# Patient Record
Sex: FEMALE | Race: WHITE | NOT HISPANIC OR LATINO | Employment: UNEMPLOYED | URBAN - METROPOLITAN AREA
[De-identification: names, ages, dates, MRNs, and addresses within clinical notes are randomized per-mention and may not be internally consistent; named-entity substitution may affect disease eponyms.]

---

## 2017-03-13 ENCOUNTER — HOSPITAL ENCOUNTER (OUTPATIENT)
Dept: CARDIOLOGY | Facility: HOSPITAL | Age: 57
Discharge: HOME | End: 2017-03-13
Payer: COMMERCIAL

## 2017-03-13 DIAGNOSIS — M16.12 PRIMARY OSTEOARTHRITIS OF LEFT HIP: ICD-10-CM

## 2017-03-29 ENCOUNTER — HOSPITAL ENCOUNTER (OUTPATIENT)
Dept: PREADMISSION TESTING | Facility: HOSPITAL | Age: 57
Discharge: HOME | End: 2017-03-29
Payer: COMMERCIAL

## 2017-03-29 VITALS
OXYGEN SATURATION: 100 % | WEIGHT: 163.81 LBS | SYSTOLIC BLOOD PRESSURE: 137 MMHG | DIASTOLIC BLOOD PRESSURE: 86 MMHG | BODY MASS INDEX: 30.14 KG/M2 | HEIGHT: 62 IN | RESPIRATION RATE: 17 BRPM | TEMPERATURE: 97 F | HEART RATE: 76 BPM

## 2017-03-29 RX ORDER — SODIUM CHLORIDE, SODIUM LACTATE, POTASSIUM CHLORIDE, CALCIUM CHLORIDE 600; 310; 30; 20 MG/100ML; MG/100ML; MG/100ML; MG/100ML
INJECTION, SOLUTION INTRAVENOUS CONTINUOUS
Status: CANCELLED | OUTPATIENT
Start: 2017-03-29

## 2017-03-29 RX ORDER — LIDOCAINE HYDROCHLORIDE 10 MG/ML
1 INJECTION, SOLUTION EPIDURAL; INFILTRATION; INTRACAUDAL; PERINEURAL ONCE
Status: CANCELLED | OUTPATIENT
Start: 2017-03-29 | End: 2017-03-29

## 2017-03-29 RX ORDER — SCOLOPAMINE TRANSDERMAL SYSTEM 1 MG/1
1 PATCH, EXTENDED RELEASE TRANSDERMAL
Status: CANCELLED | OUTPATIENT
Start: 2017-03-29 | End: 2017-03-29

## 2017-03-29 NOTE — DISCHARGE INSTRUCTIONS
Your surgery is scheduled for 04/04/17.    Please report to Outpatient Surgery Intake Office on the 2nd FLOOR at 0530 a.m.          INSTRUCTIONS IMPORTANT!!!  ¨ Do not eat or drink after 12 midnight-including water. OK to brush teeth, no   gum, candy or mints!    ¨ Take only these medicines with a small swallow of water-morning of surgery. Hyzaar          ____  Do not wear makeup, including mascara.  ____  No powder, lotions or creams to surgical area.  ____  Please remove all jewelry, including piercings and leave at home.  ____  No money or valuables needed. Please leave at home.  ____  Please bring any documents given by your doctor.  ____  If going home the same day, arrange for a ride home. You will not be able to             drive if Anesthesia was used.  ____  Wear loose fitting clothing. Allow for dressings, bandages.  ____  Stop Aspirin, Ibuprofen, Motrin and Aleve at least 3-5 days before surgery, unless otherwise instructed by your doctor, or the nurse.   You MAY use Tylenol/acetaminophen until day of surgery.  ____  Wash the surgical area with Hibiclens the night before surgery, and again the             morning of surgery.  Be sure to rinse hibiclens off completely (if instructed by   nurse).  ____  If you take diabetic medication, do not take am of surgery unless instructed by Doctor.  ____  Call MD for temperature above 101 degrees.  ____ Stop taking any Fish Oil supplement or any Vitamins that contain Vitamin E at least 5 days prior to surgery.  ____ Do Not wear your contact lenses the day of your procedure.  You may wear your glasses.        I have read or had read and explained to me, and understand the above information.  Additional comments or instructions:  For additional questions call [phone]     Take a Hibiclens shower twice a day for 3 days prior to surgery, including the morning of surgery.   Gargle with Listerine twice a day for 2 days prior to surgery, including the morning of  surgery.      Pre-Op Bathing Instructions    Before surgery, you can play an important role in your own health.    Because skin is not sterile, we need to be sure that your skin is as free of germs as possible. By following the instructions below, you can reduce the number of germs on your skin before surgery.    IMPORTANT: You will need to shower with a special soap called Hibiclens*, available at any pharmacy.  If you are allergic to Chlorhexidine (the antiseptic in Hibiclens), use an antibacterial soap such as Dial Soap for your preoperative shower.  You will shower with Hibiclens both the night before your surgery and the morning of your surgery.  Do not use Hibiclens on the head, face or genitals to avoid injury to those areas.    STEP #1: THE NIGHT BEFORE YOUR SURGERY     1. Do not shave the area of your body where your surgery will be performed.  2. Shower and wash your hair and body as usual with your normal soap and shampoo.  3. Rinse your hair and body thoroughly after you shower to remove all soap residue.  4. With your hand, apply one packet of Hibiclens soap to the surgical site.   5. Wash the site gently for five (5) minutes. Do not scrub your skin too hard.   6. Do not wash with your regular soap after Hibiclens is used.  7. Rinse your body thoroughly.  8. Pat yourself dry with a clean, soft towel.  9. Do not use lotion, cream, or powder.  10. Wear clean clothes.    STEP #2: THE MORNING OF YOUR SURGERY     1. Repeat Step #1.    * Not to be used by people allergic to Chlorhexidine.        Total Hip Replacement  Total hip replacement surgery almost always reduces joint pain. During this surgery, your problem hip joint is replaced with an artificial joint, called a prosthesis.  Benefits of hip replacement  Total hip replacement surgery almost always:  Stops or greatly reduces hip pain. Even the pain from surgery should go away within weeks.  Increases leg function. Without hip pain, youll be able to use  your legs more. This will build up your muscles.  Improves quality of life by allowing you to do daily tasks and low-impact activities in greater comfort.  Provides years of easier movement. Most total hip replacements last for many years.  Your surgical experience  You will most likely arrive at the hospital on the morning of surgery. In many cases, pre-op tests are done days or even weeks ahead of time. Follow all of your surgeons instructions on preparing for surgery. When you arrive, youll be given forms to fill out. You will also talk with the anesthesiologist,  the doctor who gives the anesthesia, if you havent done so already.  Preparing for surgery  You will be told when to stop eating and drinking before surgery. If you take daily medicines, especially blood thinners, ask your doctor if you should still take them the morning of surgery. You may need to stop certain medicines several days or weeks before the surgery. At the hospital your temperature, pulse, breathing, and blood pressure will be checked. An IV (intravenous) line will be started to provide fluids and medicines needed during surgery.    The surgical procedure  When the surgical team is ready, youll be taken to the operating room. There youll be given anesthesia. The anesthesia will help you sleep through surgery, or it will make you numb from the waist down. Then an incision is made, giving the surgeon access to your hip joint. The damaged ball is removed, and the socket is prepared to hold the prosthesis. After the new joint is in place, the incision is closed with staples or stitches.  Preparing the bone  The hip is a ball-and-socket joint. The ball is cut from the thighbone, and the surface of the old socket is smoothed. Then the new socket is put into the pelvis. The socket is usually press-fit and may be held in place with screws. A press-fit prosthesis has tiny pores on its surface that your bone will grow into. Cement or press-fit  may be used to hold the ball-and-stem portion of the hip replacement.    Joining the new parts  The new hip stem is inserted into the upper portion of your thighbone. After the stem is secure in the thighbone, the new ball and socket are joined. The stem of the prosthesis may be held with cement or press-fit. Your surgeon will choose the method that is best for you.  In the recovery room  After surgery youll be sent to the recovery room, also called the PACU (postanesthesia care unit). Your condition will be watched closely, and youll be given pain medications. You may have a catheter (small tube) in your bladder and a drain in your hip. To keep your new joint stable, a foam wedge or pillows may be placed between your legs. In some cases, a brace is used.  Risks and complications  As with any surgery, hip replacement has possible risks and complications. These include the following:  Reaction to the anesthesia  Blood clots  Infection  Dislocation of the joint or loosening of the prosthesis  Fracture  Wearing out the prosthetic  Damage to nearby blood vessels, bones, or nerves  Thigh pain   Date Last Reviewed: 8/28/2015  © 3449-0100 [employer]. [address] [place] [address]. All rights reserved. This information is not intended as a substitute for professional medical care. Always follow your healthcare professional's instructions.        Getting Ready for Hip Replacement Surgery  Take steps ahead of time to make recovery easier and safe. Make a few simple changes around the house. Also, arrange for someone to help you for a couple of weeks after your hip replacement. Before surgery, your surgeon may talk with you about ways to manage blood loss. You may also be told to see your primary care healthcare provider and your dentist. Treating health and dental problems now may improve healing after a joint replacement. If youre a smoker, do your best to stop or cut down.    Prepare at  home  Here are some suggestions:   · Stock up on canned and frozen foods. Store all supplies between waist and shoulder level.  · Prepare a room on the main living level if you usually sleep upstairs. Or set things up so you have to go upstairs only once a day.  ·  clutter and remove throw rugs. Tape down electrical cords.  Use special equipment  Special equipment may help you have a safer and easier recovery. If pre-op training is offered, try using the devices before surgery. Some of the most helpful equipment is listed below.  · An elevated toilet seat  · A shower seat  · Handrails and grab bars to help you steady yourself, especially when getting in and out of the tub  · A grasping device to pull on socks and shoes     Managing blood loss  During major surgery, a significant amount of blood loss can happen. This can cause the bodys red blood cell level to drop below a healthy range. Blood management can help prepare your body for surgery and recovery. There are three main ways to manage blood loss:   · You may be able to donate your own blood before surgery. Then you can receive your banked blood as needed.  · You may receive blood donated by another person. This blood is screened to rule out disease.  · A drug called epoetin mateo can be given before surgery. This drug stimulates the body to produce red blood cells, sometimes reducing the need for transfusion.  See your healthcare provider  Expect the following:  · You are likely to have lab or blood tests. You also may have an ECG (electrocardiogram). A chest X-ray may also be taken.  · Your healthcare provider will talk with you about health problems, such as diabetes and high blood pressure, which need to be controlled before surgery.  · Work with your healthcare provider to clear up any infections before surgery. Doing so helps ensure a better recovery.  · For your best recovery, youll be told to take care of yourself ahead of time. Maintain proper  nutrition and reduce alcohol intake.  · If you smoke, quit before surgery if possible. This may improve healing and reduce complications after joint replacement.  Discuss your medicines  Tell your surgeon about all of the medicines you take. This includes herbs and supplements, as well as prescription and over-the-counter medicines. Some medicines dont go well with anesthesia. Others, such as aspirin, blood thinners, and ibuprofen, increase bleeding. To avoid problems during surgery, you may need to stop taking certain medicines before hip replacement.  Finish dental work  Have any tooth or gum problems treated before surgery. Also, finish any dental work that may be under way. If you dont, germs in your mouth could enter the bloodstream and infect the new joint. This could delay your recovery. In an extreme case, infection in the new joint might mean that the prosthesis would have to be removed.  Date Last Reviewed: 10/1/2015  © 4891-1623 [employer]. [address] [place] [address]. All rights reserved. This information is not intended as a substitute for professional medical care. Always follow your healthcare professional's instructions.      Anesthesia: General Anesthesia  Youre due to have surgery. During surgery, youll be given medication called anesthesia. (It is also called anesthetic.) This will keep you comfortable and pain-free. Your anesthesia provider will use general anesthesia. This sheet tells you more about it.  What is general anesthesia?     You are watched continuously during your procedure by the anesthesia provider   General anesthesia puts you into a state like deep sleep. It goes into the bloodstream (IV anesthetics), into the lungs (gas anesthetics), or both. You feel nothing during the procedure. You will not remember it. During the procedure, the anesthesia provider monitors you continuously. He or she checks your heart rate and rhythm, blood pressure,  breathing, and blood oxygen.  · IV Anesthetics. IV anesthetics are given through an IV line in your arm. Theyre often given first. This is so you are asleep before a gas anesthetic is started. Some kinds of IV anesthetics relieve pain. Others relax you. Your doctor will decide which kind is best in your case.  · Gas Anesthetics. Gas anesthetics are breathed into the lungs. They are often used to keep you asleep. They can be given through a facemask or a tube placed in your larynx or trachea (breathing tube).  ¨ If you have a facemask, your anesthesia provider will most likely place it over your nose and mouth while youre still awake. Youll breathe oxygen through the mask as your IV anesthetic is started. Gas anesthetic may be added through the mask.  ¨ If you have a tube in the larynx or trachea, it will be inserted into your throat after youre asleep.  Anesthesia tools and medications  You will likely have:  · IV anesthetics. These are put into an IV line into your bloodstream.  · Gas anesthetics. You breathe these anesthetics into your lungs, where they pass into your bloodstream.  · Pulse oximeter. This is a small clip that is attached to the end of your finger. This measures your blood oxygen level.  · Electrocardiography leads (electrodes). These are small sticky pads that are placed on your chest. They record your heart rate and rhythm.  · Blood pressure cuff. This reads your blood pressure.  Risks and possible complications  General anesthesia has some risks. These include:  · Breathing problems  · Nausea and vomiting  · Sore throat or hoarseness (usually temporary)  · Allergic reaction to the anesthetic  · Irregular heartbeat (rare)  · Cardiac arrest (rare)   Anesthesia safety  · Follow all instructions you are given for how long not to eat or drink before your procedure.  · Be sure your doctor knows what medications and drugs you take. This includes over-the-counter medications, herbs, supplements,  alcohol or other drugs. You will be asked when those were last taken.  · Have an adult family member or friend drive you home after the procedure.  · For the first 24 hours after your surgery:  ¨ Do not drive or use heavy equipment.  ¨ Have a trusted family member or spouse make important decisions or sign documents.  ¨ Avoid alcohol.  ¨ Have a responsible adult stay with you. He or she can watch for problems and help keep you safe.  Date Last Reviewed: 10/16/2014  © 6765-7266 [employer]. [address] [place] [address], [place] [address]. All rights reserved. This information is not intended as a substitute for professional medical care. Always follow your healthcare professional's instructions.

## 2017-03-29 NOTE — IP AVS SNAPSHOT
[facility]  [address]  [place] [address]  Phone: [phone]           Patient Discharge Instructions  Our goal is to set you up for success. This packet includes information on your condition, medications, and your home care. It will help you care for yourself to prevent having to return to the hospital.     Please ask your nurse if you have any questions.      There are many details to remember when preparing for your surgery. Here is what you will need to do, please ask your nurse if there are more specific instructions and if you have any questions:    1. Before procedure Do not smoke or drink alcoholic beverages 24 hours prior to your procedure. Do not eat or drink anything 8 hours before your procedure - this includes gum, mints, and candy.     2. Day of procedure Please remove all jewelry for the procedure. If you wear contact lenses, dentures, hearing aids or glasses, bring a container to put them in during your surgery and give to a family member.  If your doctor has scheduled you for an overnight stay, bring a small overnight bag with any personal items that you need.      3. After procedure  Make arrangements in advance for transportation home by a responsible adult. It is not safe to drive a vehicle during the 24 hours following surgery.     PLEASE NOTE: You may be contacted the day before your surgery to confirm your surgery date and arrival time. The Surgery schedule has many variables which may affect the time of your surgery case. Family members should be available if your surgery time changes.               ** Verify the list of medication(s) below is accurate and up to date. Carry this with you in case of emergency. If your medications have changed, please notify your healthcare provider.             Medication List      TAKE these medications        Additional Info                      aspirin-acetaminophen-caffeine 250-250-65 mg 250-250-65 mg per tablet   Commonly known  as:  EXCEDRIN MIGRAINE   Refills:  0   Dose:  1 tablet    Instructions:  Take 1 tablet by mouth every 6 (six) hours as needed for Pain.     Begin Date    AM    Noon    PM    Bedtime       atorvastatin 40 MG tablet   Commonly known as:  LIPITOR   Refills:  0   Dose:  40 mg    Instructions:  Take 40 mg by mouth once daily.     Begin Date    AM    Noon    PM    Bedtime       B-complex with vitamin C tablet   Commonly known as:  Z-Bec or Equiv   Refills:  0   Dose:  1 tablet    Instructions:  Take 1 tablet by mouth once daily.     Begin Date    AM    Noon    PM    Bedtime       biotin 10,000 mcg Tbdl   Refills:  0    Instructions:  Take by mouth.     Begin Date    AM    Noon    PM    Bedtime       losartan-hydrochlorothiazide 100-25 mg 100-25 mg per tablet   Commonly known as:  HYZAAR   Refills:  0   Dose:  1 tablet    Instructions:  Take 1 tablet by mouth once daily.     Begin Date    AM    Noon    PM    Bedtime       potassium chloride SA 10 MEQ tablet   Commonly known as:  K-DUR,KLOR-CON   Refills:  0   Dose:  20 mEq    Instructions:  Take 20 mEq by mouth 2 (two) times daily.     Begin Date    AM    Noon    PM    Bedtime       VITAMIN D-3 ORAL   Refills:  0   Dose:  2000 mg    Instructions:  Take 2,000 mg by mouth once daily.     Begin Date    AM    Noon    PM    Bedtime                  Please bring to all follow up appointments:    1. A copy of your discharge instructions.  2. All medicines you are currently taking in their original bottles.  3. Identification and insurance card.    Please arrive 15 minutes ahead of scheduled appointment time.    Please call 24 hours in advance if you must reschedule your appointment and/or time.        Your Scheduled Appointments     Mar 29, 2017 11:30 AM CDT   Education Class with [facility] [facility]    [address] [place] [address]  [place] [address]   [phone]              Your Future Surgeries/Procedures     Apr 04, 2017   Surgery with [place] RAMON  MD Pedro   [facility]    [address] [facility] [place] [id number] [phone]                  Discharge Instructions       Your surgery is scheduled for 04/04/17.    Please report to Outpatient Surgery Intake Office on the 2nd FLOOR at 0530 a.m.          INSTRUCTIONS IMPORTANT!!!  ¨ Do not eat or drink after 12 midnight-including water. OK to brush teeth, no   gum, candy or mints!    ¨ Take only these medicines with a small swallow of water-morning of surgery. Hyzaar          ____  Do not wear makeup, including mascara.  ____  No powder, lotions or creams to surgical area.  ____  Please remove all jewelry, including piercings and leave at home.  ____  No money or valuables needed. Please leave at home.  ____  Please bring any documents given by your doctor.  ____  If going home the same day, arrange for a ride home. You will not be able to             drive if Anesthesia was used.  ____  Wear loose fitting clothing. Allow for dressings, bandages.  ____  Stop Aspirin, Ibuprofen, Motrin and Aleve at least 3-5 days before surgery, unless otherwise instructed by your doctor, or the nurse.   You MAY use Tylenol/acetaminophen until day of surgery.  ____  Wash the surgical area with Hibiclens the night before surgery, and again the             morning of surgery.  Be sure to rinse hibiclens off completely (if instructed by   nurse).  ____  If you take diabetic medication, do not take am of surgery unless instructed by Doctor.  ____  Call MD for temperature above 101 degrees.  ____ Stop taking any Fish Oil supplement or any Vitamins that contain Vitamin E at least 5 days prior to surgery.  ____ Do Not wear your contact lenses the day of your procedure.  You may wear your glasses.        I have read or had read and explained to me, and understand the above information.  Additional comments or instructions:  For additional questions call [phone]     Take a Hibiclens shower  twice a day for 3 days prior to surgery, including the morning of surgery.   Gargle with Listerine twice a day for 2 days prior to surgery, including the morning of surgery.      Pre-Op Bathing Instructions    Before surgery, you can play an important role in your own health.    Because skin is not sterile, we need to be sure that your skin is as free of germs as possible. By following the instructions below, you can reduce the number of germs on your skin before surgery.    IMPORTANT: You will need to shower with a special soap called Hibiclens*, available at any pharmacy.  If you are allergic to Chlorhexidine (the antiseptic in Hibiclens), use an antibacterial soap such as Dial Soap for your preoperative shower.  You will shower with Hibiclens both the night before your surgery and the morning of your surgery.  Do not use Hibiclens on the head, face or genitals to avoid injury to those areas.    STEP #1: THE NIGHT BEFORE YOUR SURGERY     1. Do not shave the area of your body where your surgery will be performed.  2. Shower and wash your hair and body as usual with your normal soap and shampoo.  3. Rinse your hair and body thoroughly after you shower to remove all soap residue.  4. With your hand, apply one packet of Hibiclens soap to the surgical site.   5. Wash the site gently for five (5) minutes. Do not scrub your skin too hard.   6. Do not wash with your regular soap after Hibiclens is used.  7. Rinse your body thoroughly.  8. Pat yourself dry with a clean, soft towel.  9. Do not use lotion, cream, or powder.  10. Wear clean clothes.    STEP #2: THE MORNING OF YOUR SURGERY     1. Repeat Step #1.    * Not to be used by people allergic to Chlorhexidine.        Total Hip Replacement  Total hip replacement surgery almost always reduces joint pain. During this surgery, your problem hip joint is replaced with an artificial joint, called a prosthesis.  Benefits of hip replacement  Total hip replacement surgery  almost always:  Stops or greatly reduces hip pain. Even the pain from surgery should go away within weeks.  Increases leg function. Without hip pain, youll be able to use your legs more. This will build up your muscles.  Improves quality of life by allowing you to do daily tasks and low-impact activities in greater comfort.  Provides years of easier movement. Most total hip replacements last for many years.  Your surgical experience  You will most likely arrive at the hospital on the morning of surgery. In many cases, pre-op tests are done days or even weeks ahead of time. Follow all of your surgeons instructions on preparing for surgery. When you arrive, youll be given forms to fill out. You will also talk with the anesthesiologist,  the doctor who gives the anesthesia, if you havent done so already.  Preparing for surgery  You will be told when to stop eating and drinking before surgery. If you take daily medicines, especially blood thinners, ask your doctor if you should still take them the morning of surgery. You may need to stop certain medicines several days or weeks before the surgery. At the hospital your temperature, pulse, breathing, and blood pressure will be checked. An IV (intravenous) line will be started to provide fluids and medicines needed during surgery.    The surgical procedure  When the surgical team is ready, youll be taken to the operating room. There youll be given anesthesia. The anesthesia will help you sleep through surgery, or it will make you numb from the waist down. Then an incision is made, giving the surgeon access to your hip joint. The damaged ball is removed, and the socket is prepared to hold the prosthesis. After the new joint is in place, the incision is closed with staples or stitches.  Preparing the bone  The hip is a ball-and-socket joint. The ball is cut from the thighbone, and the surface of the old socket is smoothed. Then the new socket is put into the pelvis. The  socket is usually press-fit and may be held in place with screws. A press-fit prosthesis has tiny pores on its surface that your bone will grow into. Cement or press-fit may be used to hold the ball-and-stem portion of the hip replacement.    Joining the new parts  The new hip stem is inserted into the upper portion of your thighbone. After the stem is secure in the thighbone, the new ball and socket are joined. The stem of the prosthesis may be held with cement or press-fit. Your surgeon will choose the method that is best for you.  In the recovery room  After surgery youll be sent to the recovery room, also called the PACU (postanesthesia care unit). Your condition will be watched closely, and youll be given pain medications. You may have a catheter (small tube) in your bladder and a drain in your hip. To keep your new joint stable, a foam wedge or pillows may be placed between your legs. In some cases, a brace is used.  Risks and complications  As with any surgery, hip replacement has possible risks and complications. These include the following:  Reaction to the anesthesia  Blood clots  Infection  Dislocation of the joint or loosening of the prosthesis  Fracture  Wearing out the prosthetic  Damage to nearby blood vessels, bones, or nerves  Thigh pain   Date Last Reviewed: 8/28/2015  © 6608-9511 [employer], [employer]. [address] [place] [address]. All rights reserved. This information is not intended as a substitute for professional medical care. Always follow your healthcare professional's instructions.        Getting Ready for Hip Replacement Surgery  Take steps ahead of time to make recovery easier and safe. Make a few simple changes around the house. Also, arrange for someone to help you for a couple of weeks after your hip replacement. Before surgery, your surgeon may talk with you about ways to manage blood loss. You may also be told to see your primary care healthcare provider and your  dentist. Treating health and dental problems now may improve healing after a joint replacement. If youre a smoker, do your best to stop or cut down.    Prepare at home  Here are some suggestions:   · Stock up on canned and frozen foods. Store all supplies between waist and shoulder level.  · Prepare a room on the main living level if you usually sleep upstairs. Or set things up so you have to go upstairs only once a day.  ·  clutter and remove throw rugs. Tape down electrical cords.  Use special equipment  Special equipment may help you have a safer and easier recovery. If pre-op training is offered, try using the devices before surgery. Some of the most helpful equipment is listed below.  · An elevated toilet seat  · A shower seat  · Handrails and grab bars to help you steady yourself, especially when getting in and out of the tub  · A grasping device to pull on socks and shoes     Managing blood loss  During major surgery, a significant amount of blood loss can happen. This can cause the bodys red blood cell level to drop below a healthy range. Blood management can help prepare your body for surgery and recovery. There are three main ways to manage blood loss:   · You may be able to donate your own blood before surgery. Then you can receive your banked blood as needed.  · You may receive blood donated by another person. This blood is screened to rule out disease.  · A drug called epoetin mateo can be given before surgery. This drug stimulates the body to produce red blood cells, sometimes reducing the need for transfusion.  See your healthcare provider  Expect the following:  · You are likely to have lab or blood tests. You also may have an ECG (electrocardiogram). A chest X-ray may also be taken.  · Your healthcare provider will talk with you about health problems, such as diabetes and high blood pressure, which need to be controlled before surgery.  · Work with your healthcare provider to clear up any  infections before surgery. Doing so helps ensure a better recovery.  · For your best recovery, youll be told to take care of yourself ahead of time. Maintain proper nutrition and reduce alcohol intake.  · If you smoke, quit before surgery if possible. This may improve healing and reduce complications after joint replacement.  Discuss your medicines  Tell your surgeon about all of the medicines you take. This includes herbs and supplements, as well as prescription and over-the-counter medicines. Some medicines dont go well with anesthesia. Others, such as aspirin, blood thinners, and ibuprofen, increase bleeding. To avoid problems during surgery, you may need to stop taking certain medicines before hip replacement.  Finish dental work  Have any tooth or gum problems treated before surgery. Also, finish any dental work that may be under way. If you dont, germs in your mouth could enter the bloodstream and infect the new joint. This could delay your recovery. In an extreme case, infection in the new joint might mean that the prosthesis would have to be removed.  Date Last Reviewed: 10/1/2015  © 9107-9660 [employer]. [address] [place] [address]. All rights reserved. This information is not intended as a substitute for professional medical care. Always follow your healthcare professional's instructions.      Anesthesia: General Anesthesia  Youre due to have surgery. During surgery, youll be given medication called anesthesia. (It is also called anesthetic.) This will keep you comfortable and pain-free. Your anesthesia provider will use general anesthesia. This sheet tells you more about it.  What is general anesthesia?     You are watched continuously during your procedure by the anesthesia provider   General anesthesia puts you into a state like deep sleep. It goes into the bloodstream (IV anesthetics), into the lungs (gas anesthetics), or both. You feel nothing during the procedure.  You will not remember it. During the procedure, the anesthesia provider monitors you continuously. He or she checks your heart rate and rhythm, blood pressure, breathing, and blood oxygen.  · IV Anesthetics. IV anesthetics are given through an IV line in your arm. Theyre often given first. This is so you are asleep before a gas anesthetic is started. Some kinds of IV anesthetics relieve pain. Others relax you. Your doctor will decide which kind is best in your case.  · Gas Anesthetics. Gas anesthetics are breathed into the lungs. They are often used to keep you asleep. They can be given through a facemask or a tube placed in your larynx or trachea (breathing tube).  ¨ If you have a facemask, your anesthesia provider will most likely place it over your nose and mouth while youre still awake. Youll breathe oxygen through the mask as your IV anesthetic is started. Gas anesthetic may be added through the mask.  ¨ If you have a tube in the larynx or trachea, it will be inserted into your throat after youre asleep.  Anesthesia tools and medications  You will likely have:  · IV anesthetics. These are put into an IV line into your bloodstream.  · Gas anesthetics. You breathe these anesthetics into your lungs, where they pass into your bloodstream.  · Pulse oximeter. This is a small clip that is attached to the end of your finger. This measures your blood oxygen level.  · Electrocardiography leads (electrodes). These are small sticky pads that are placed on your chest. They record your heart rate and rhythm.  · Blood pressure cuff. This reads your blood pressure.  Risks and possible complications  General anesthesia has some risks. These include:  · Breathing problems  · Nausea and vomiting  · Sore throat or hoarseness (usually temporary)  · Allergic reaction to the anesthetic  · Irregular heartbeat (rare)  · Cardiac arrest (rare)   Anesthesia safety  · Follow all instructions you are given for how long not to eat or  drink before your procedure.  · Be sure your doctor knows what medications and drugs you take. This includes over-the-counter medications, herbs, supplements, alcohol or other drugs. You will be asked when those were last taken.  · Have an adult family member or friend drive you home after the procedure.  · For the first 24 hours after your surgery:  ¨ Do not drive or use heavy equipment.  ¨ Have a trusted family member or spouse make important decisions or sign documents.  ¨ Avoid alcohol.  ¨ Have a responsible adult stay with you. He or she can watch for problems and help keep you safe.  Date Last Reviewed: 10/16/2014  © 8009-6005 [employer]. [address] [place] [address], [place] [address]. All rights reserved. This information is not intended as a substitute for professional medical care. Always follow your healthcare professional's instructions.                  Admission Information     Date & Time Provider Department CSN    3/29/2017 10:00 AM Len Vasquez MD [facility] [id number]      Care Providers     Provider Role Specialty Primary office phone    Len Vasquez MD Attending Provider Orthopedic Surgery [phone]      Recent Lab Values     No lab values to display.      Allergies as of 3/29/2017     No Known Allergies      [facility]     [facility] Nurse Care Line - 24/7 Assistance  Unless otherwise directed by your provider, please contact [facility], our nurse care line that is available for 24/7 assistance.     Registered nurses in the [facility] provide clinical advisement, health education, appointment booking, and other advisory services.  Call for this free service at [phone].        Advance Directives     An advance directive is a document which, in the event you are no longer able to make decisions for yourself, tells your healthcare team what kind of treatment you do or do not want to receive, or who you would like to make  those decisions for you.  If you do not currently have an advance directive, [facility] encourages you to create one.  For more information call:  [phone]), [phone] ([phone]),  or log on to [email or web address].        Language Assistance Services     ATTENTION: Language assistance services are available, free of charge. Please call [phone].      ATENCIÓN: Si habla letitia, tiene a north disposición servicios gratuitos de asistencia lingüística. Llame al [phone].     [facility] Ý: N?u b?n nói Ti?ng Vi?t, có các d?ch v? h? tr? ngôn ng? mi?n phí dành cho b?n. G?i s? [phone].        [facility] Sign-Up     Activating your [facility] account is as easy as 1-2-3!     1) Visit [email or web address], select Sign Up Now, enter this activation code and your date of birth, then select Next.  [email or web address]  Expires: 5/13/2017 10:30 AM      2) Create a username and password to use when you visit [facility] in the future and select a security question in case you lose your password and select Next.    3) Enter your e-mail address and click Sign Up!    Additional Information  If you have questions, please e-mail [email or web address] or call [phone] to talk to our [facility] staff. Remember, [facility] is NOT to be used for urgent needs. For medical emergencies, dial 911.          [facility] complies with applicable Federal civil rights laws and does not discriminate on the basis of race, color, national origin, age, disability, or sex.

## 2017-03-29 NOTE — PLAN OF CARE
Cardio clearance received with no note from doctor, patient to call office and try to get note faxed over

## 2017-04-11 ENCOUNTER — ANESTHESIA EVENT (OUTPATIENT)
Dept: SURGERY | Facility: HOSPITAL | Age: 57
DRG: 470 | End: 2017-04-11
Payer: COMMERCIAL

## 2017-04-11 ENCOUNTER — ANESTHESIA (OUTPATIENT)
Dept: SURGERY | Facility: HOSPITAL | Age: 57
DRG: 470 | End: 2017-04-11
Payer: COMMERCIAL

## 2017-04-11 ENCOUNTER — HOSPITAL ENCOUNTER (INPATIENT)
Facility: HOSPITAL | Age: 57
LOS: 1 days | Discharge: HOME HEALTH | DRG: 470 | End: 2017-04-12
Payer: COMMERCIAL

## 2017-04-11 DIAGNOSIS — M16.9 DEGENERATIVE JOINT DISEASE (DJD) OF HIP: ICD-10-CM

## 2017-04-11 PROCEDURE — 71000039 HC RECOVERY, EACH ADD'L HOUR

## 2017-04-11 PROCEDURE — 36000711

## 2017-04-11 PROCEDURE — 63600175 PHARM REV CODE 636 W HCPCS: Performed by: ANESTHESIOLOGY

## 2017-04-11 PROCEDURE — 37000008 HC ANESTHESIA 1ST 15 MINUTES

## 2017-04-11 PROCEDURE — C1776 JOINT DEVICE (IMPLANTABLE): HCPCS

## 2017-04-11 PROCEDURE — 97530 THERAPEUTIC ACTIVITIES: CPT

## 2017-04-11 PROCEDURE — 37000009 HC ANESTHESIA EA ADD 15 MINS

## 2017-04-11 PROCEDURE — 27800903 OPTIME MED/SURG SUP & DEVICES OTHER IMPLANTS

## 2017-04-11 PROCEDURE — 11000001 HC ACUTE MED/SURG PRIVATE ROOM

## 2017-04-11 PROCEDURE — 63600175 PHARM REV CODE 636 W HCPCS

## 2017-04-11 PROCEDURE — 25000003 PHARM REV CODE 250: Performed by: NURSE ANESTHETIST, CERTIFIED REGISTERED

## 2017-04-11 PROCEDURE — 25000003 PHARM REV CODE 250

## 2017-04-11 PROCEDURE — 63600175 PHARM REV CODE 636 W HCPCS: Performed by: NURSE ANESTHETIST, CERTIFIED REGISTERED

## 2017-04-11 PROCEDURE — 36000710

## 2017-04-11 PROCEDURE — 97165 OT EVAL LOW COMPLEX 30 MIN: CPT

## 2017-04-11 PROCEDURE — 0SRB04Z REPLACEMENT OF LEFT HIP JOINT WITH CERAMIC ON POLYETHYLENE SYNTHETIC SUBSTITUTE, OPEN APPROACH: ICD-10-PCS

## 2017-04-11 PROCEDURE — 97161 PT EVAL LOW COMPLEX 20 MIN: CPT

## 2017-04-11 PROCEDURE — 97116 GAIT TRAINING THERAPY: CPT

## 2017-04-11 PROCEDURE — 71000033 HC RECOVERY, INTIAL HOUR

## 2017-04-11 PROCEDURE — 25000003 PHARM REV CODE 250: Performed by: ANESTHESIOLOGY

## 2017-04-11 PROCEDURE — 94761 N-INVAS EAR/PLS OXIMETRY MLT: CPT

## 2017-04-11 PROCEDURE — C1713 ANCHOR/SCREW BN/BN,TIS/BN: HCPCS

## 2017-04-11 PROCEDURE — 27201423 OPTIME MED/SURG SUP & DEVICES STERILE SUPPLY

## 2017-04-11 DEVICE — HEAD CERAMIC DELTA 36MM +5: Type: IMPLANTABLE DEVICE | Site: HIP | Status: FUNCTIONAL

## 2017-04-11 DEVICE — CUP ACET PINN 10D 32MM 52MM: Type: IMPLANTABLE DEVICE | Site: HIP | Status: FUNCTIONAL

## 2017-04-11 DEVICE — STEM FEM TPR PCT STD SZ 3: Type: IMPLANTABLE DEVICE | Site: HIP | Status: FUNCTIONAL

## 2017-04-11 DEVICE — LINER ACET PINN SZ36 10D+4X52M: Type: IMPLANTABLE DEVICE | Site: HIP | Status: FUNCTIONAL

## 2017-04-11 DEVICE — SCREW PINNACLE 6.5 X 20: Type: IMPLANTABLE DEVICE | Site: HIP | Status: FUNCTIONAL

## 2017-04-11 DEVICE — PLUG ELIM HOLE POSITIVE STOP: Type: IMPLANTABLE DEVICE | Site: HIP | Status: FUNCTIONAL

## 2017-04-11 RX ORDER — HYDROMORPHONE HYDROCHLORIDE 1 MG/ML
0.2 INJECTION, SOLUTION INTRAMUSCULAR; INTRAVENOUS; SUBCUTANEOUS
Status: DISCONTINUED | OUTPATIENT
Start: 2017-04-11 | End: 2017-04-12 | Stop reason: HOSPADM

## 2017-04-11 RX ORDER — CEFAZOLIN SODIUM 2 G/50ML
2 SOLUTION INTRAVENOUS
Status: COMPLETED | OUTPATIENT
Start: 2017-04-11 | End: 2017-04-12

## 2017-04-11 RX ORDER — SCOLOPAMINE TRANSDERMAL SYSTEM 1 MG/1
1 PATCH, EXTENDED RELEASE TRANSDERMAL ONCE
Status: COMPLETED | OUTPATIENT
Start: 2017-04-11 | End: 2017-04-11

## 2017-04-11 RX ORDER — GLYCOPYRROLATE 0.2 MG/ML
INJECTION INTRAMUSCULAR; INTRAVENOUS
Status: DISCONTINUED | OUTPATIENT
Start: 2017-04-11 | End: 2017-04-11

## 2017-04-11 RX ORDER — CEFAZOLIN SODIUM 2 G/50ML
2 SOLUTION INTRAVENOUS ONCE
Status: COMPLETED | OUTPATIENT
Start: 2017-04-11 | End: 2017-04-11

## 2017-04-11 RX ORDER — SODIUM CHLORIDE 9 MG/ML
INJECTION, SOLUTION INTRAVENOUS CONTINUOUS PRN
Status: DISCONTINUED | OUTPATIENT
Start: 2017-04-11 | End: 2017-04-11

## 2017-04-11 RX ORDER — MUPIROCIN 20 MG/G
1 OINTMENT TOPICAL 2 TIMES DAILY
Status: DISCONTINUED | OUTPATIENT
Start: 2017-04-11 | End: 2017-04-12 | Stop reason: HOSPADM

## 2017-04-11 RX ORDER — TRANEXAMIC ACID 100 MG/ML
1000 INJECTION, SOLUTION INTRAVENOUS ONCE
Status: COMPLETED | OUTPATIENT
Start: 2017-04-11 | End: 2017-04-11

## 2017-04-11 RX ORDER — OXYCODONE HYDROCHLORIDE 5 MG/1
10 TABLET ORAL EVERY 4 HOURS PRN
Status: DISCONTINUED | OUTPATIENT
Start: 2017-04-11 | End: 2017-04-12 | Stop reason: HOSPADM

## 2017-04-11 RX ORDER — LOPERAMIDE HYDROCHLORIDE 2 MG/1
4 CAPSULE ORAL ONCE
Status: DISCONTINUED | OUTPATIENT
Start: 2017-04-11 | End: 2017-04-12 | Stop reason: HOSPADM

## 2017-04-11 RX ORDER — ZOLPIDEM TARTRATE 5 MG/1
5 TABLET ORAL NIGHTLY PRN
Status: DISCONTINUED | OUTPATIENT
Start: 2017-04-11 | End: 2017-04-12 | Stop reason: HOSPADM

## 2017-04-11 RX ORDER — LABETALOL HYDROCHLORIDE 5 MG/ML
INJECTION, SOLUTION INTRAVENOUS
Status: DISCONTINUED | OUTPATIENT
Start: 2017-04-11 | End: 2017-04-11

## 2017-04-11 RX ORDER — FENTANYL CITRATE 50 UG/ML
INJECTION, SOLUTION INTRAMUSCULAR; INTRAVENOUS
Status: DISCONTINUED | OUTPATIENT
Start: 2017-04-11 | End: 2017-04-11

## 2017-04-11 RX ORDER — HYDROCHLOROTHIAZIDE 25 MG/1
25 TABLET ORAL DAILY
Status: DISCONTINUED | OUTPATIENT
Start: 2017-04-11 | End: 2017-04-12 | Stop reason: HOSPADM

## 2017-04-11 RX ORDER — MIDAZOLAM HYDROCHLORIDE 1 MG/ML
INJECTION, SOLUTION INTRAMUSCULAR; INTRAVENOUS
Status: DISCONTINUED | OUTPATIENT
Start: 2017-04-11 | End: 2017-04-11

## 2017-04-11 RX ORDER — SUCCINYLCHOLINE CHLORIDE 20 MG/ML
INJECTION INTRAMUSCULAR; INTRAVENOUS
Status: DISCONTINUED | OUTPATIENT
Start: 2017-04-11 | End: 2017-04-11

## 2017-04-11 RX ORDER — SODIUM CHLORIDE 0.9 % (FLUSH) 0.9 %
3 SYRINGE (ML) INJECTION EVERY 8 HOURS
Status: DISCONTINUED | OUTPATIENT
Start: 2017-04-11 | End: 2017-04-11 | Stop reason: HOSPADM

## 2017-04-11 RX ORDER — ATORVASTATIN CALCIUM 10 MG/1
40 TABLET, FILM COATED ORAL DAILY
Status: DISCONTINUED | OUTPATIENT
Start: 2017-04-11 | End: 2017-04-12 | Stop reason: HOSPADM

## 2017-04-11 RX ORDER — DEXAMETHASONE SODIUM PHOSPHATE 4 MG/ML
INJECTION, SOLUTION INTRA-ARTICULAR; INTRALESIONAL; INTRAMUSCULAR; INTRAVENOUS; SOFT TISSUE
Status: DISCONTINUED | OUTPATIENT
Start: 2017-04-11 | End: 2017-04-11

## 2017-04-11 RX ORDER — SODIUM CHLORIDE, SODIUM LACTATE, POTASSIUM CHLORIDE, CALCIUM CHLORIDE 600; 310; 30; 20 MG/100ML; MG/100ML; MG/100ML; MG/100ML
INJECTION, SOLUTION INTRAVENOUS CONTINUOUS
Status: DISCONTINUED | OUTPATIENT
Start: 2017-04-11 | End: 2017-04-12

## 2017-04-11 RX ORDER — AMOXICILLIN 250 MG
1 CAPSULE ORAL 2 TIMES DAILY
Status: DISCONTINUED | OUTPATIENT
Start: 2017-04-11 | End: 2017-04-12 | Stop reason: HOSPADM

## 2017-04-11 RX ORDER — ACETAMINOPHEN 325 MG/1
650 TABLET ORAL EVERY 4 HOURS PRN
Status: DISCONTINUED | OUTPATIENT
Start: 2017-04-11 | End: 2017-04-12 | Stop reason: HOSPADM

## 2017-04-11 RX ORDER — SODIUM CHLORIDE 0.9 % (FLUSH) 0.9 %
3 SYRINGE (ML) INJECTION
Status: DISCONTINUED | OUTPATIENT
Start: 2017-04-11 | End: 2017-04-11

## 2017-04-11 RX ORDER — NEOSTIGMINE METHYLSULFATE 1 MG/ML
INJECTION, SOLUTION INTRAVENOUS
Status: DISCONTINUED | OUTPATIENT
Start: 2017-04-11 | End: 2017-04-11

## 2017-04-11 RX ORDER — LIDOCAINE HCL/PF 100 MG/5ML
SYRINGE (ML) INTRAVENOUS
Status: DISCONTINUED | OUTPATIENT
Start: 2017-04-11 | End: 2017-04-11

## 2017-04-11 RX ORDER — ONDANSETRON 2 MG/ML
4 INJECTION INTRAMUSCULAR; INTRAVENOUS EVERY 12 HOURS PRN
Status: DISCONTINUED | OUTPATIENT
Start: 2017-04-11 | End: 2017-04-12 | Stop reason: HOSPADM

## 2017-04-11 RX ORDER — CHOLECALCIFEROL (VITAMIN D3) 25 MCG
5000 TABLET ORAL DAILY
Status: DISCONTINUED | OUTPATIENT
Start: 2017-04-11 | End: 2017-04-12 | Stop reason: HOSPADM

## 2017-04-11 RX ORDER — PROPOFOL 10 MG/ML
VIAL (ML) INTRAVENOUS
Status: DISCONTINUED | OUTPATIENT
Start: 2017-04-11 | End: 2017-04-11

## 2017-04-11 RX ORDER — POTASSIUM CHLORIDE 20 MEQ/1
20 TABLET, EXTENDED RELEASE ORAL 2 TIMES DAILY
Status: DISCONTINUED | OUTPATIENT
Start: 2017-04-11 | End: 2017-04-12 | Stop reason: HOSPADM

## 2017-04-11 RX ORDER — HYDROMORPHONE HYDROCHLORIDE 2 MG/ML
0.5 INJECTION, SOLUTION INTRAMUSCULAR; INTRAVENOUS; SUBCUTANEOUS EVERY 5 MIN PRN
Status: DISCONTINUED | OUTPATIENT
Start: 2017-04-11 | End: 2017-04-11 | Stop reason: HOSPADM

## 2017-04-11 RX ORDER — ACETAMINOPHEN 10 MG/ML
INJECTION, SOLUTION INTRAVENOUS
Status: DISCONTINUED | OUTPATIENT
Start: 2017-04-11 | End: 2017-04-11

## 2017-04-11 RX ORDER — ONDANSETRON 2 MG/ML
INJECTION INTRAMUSCULAR; INTRAVENOUS
Status: DISCONTINUED | OUTPATIENT
Start: 2017-04-11 | End: 2017-04-11

## 2017-04-11 RX ORDER — POLYETHYLENE GLYCOL 3350 17 G/17G
17 POWDER, FOR SOLUTION ORAL DAILY
Status: DISCONTINUED | OUTPATIENT
Start: 2017-04-11 | End: 2017-04-12 | Stop reason: HOSPADM

## 2017-04-11 RX ORDER — ONDANSETRON 2 MG/ML
4 INJECTION INTRAMUSCULAR; INTRAVENOUS ONCE AS NEEDED
Status: DISCONTINUED | OUTPATIENT
Start: 2017-04-11 | End: 2017-04-11 | Stop reason: HOSPADM

## 2017-04-11 RX ORDER — LOSARTAN POTASSIUM 50 MG/1
100 TABLET ORAL DAILY
Status: DISCONTINUED | OUTPATIENT
Start: 2017-04-11 | End: 2017-04-12 | Stop reason: HOSPADM

## 2017-04-11 RX ORDER — ROCURONIUM BROMIDE 10 MG/ML
INJECTION, SOLUTION INTRAVENOUS
Status: DISCONTINUED | OUTPATIENT
Start: 2017-04-11 | End: 2017-04-11

## 2017-04-11 RX ORDER — SODIUM CHLORIDE 9 MG/ML
INJECTION, SOLUTION INTRAVENOUS CONTINUOUS
Status: DISCONTINUED | OUTPATIENT
Start: 2017-04-11 | End: 2017-04-11

## 2017-04-11 RX ADMIN — SUCCINYLCHOLINE CHLORIDE 120 MG: 20 INJECTION, SOLUTION INTRAMUSCULAR; INTRAVENOUS at 09:04

## 2017-04-11 RX ADMIN — MIDAZOLAM 2 MG: 1 INJECTION INTRAMUSCULAR; INTRAVENOUS at 09:04

## 2017-04-11 RX ADMIN — SCOPALAMINE 1.5 MG: 1 PATCH, EXTENDED RELEASE TRANSDERMAL at 07:04

## 2017-04-11 RX ADMIN — HYDROMORPHONE HYDROCHLORIDE 0.5 MG: 2 INJECTION, SOLUTION INTRAMUSCULAR; INTRAVENOUS; SUBCUTANEOUS at 12:04

## 2017-04-11 RX ADMIN — FENTANYL CITRATE 50 MCG: 50 INJECTION, SOLUTION INTRAMUSCULAR; INTRAVENOUS at 09:04

## 2017-04-11 RX ADMIN — ROCURONIUM BROMIDE 5 MG: 10 INJECTION, SOLUTION INTRAVENOUS at 09:04

## 2017-04-11 RX ADMIN — ONDANSETRON 4 MG: 2 INJECTION, SOLUTION INTRAMUSCULAR; INTRAVENOUS at 10:04

## 2017-04-11 RX ADMIN — PROPOFOL 160 MG: 10 INJECTION, EMULSION INTRAVENOUS at 09:04

## 2017-04-11 RX ADMIN — SODIUM CHLORIDE: 0.9 INJECTION, SOLUTION INTRAVENOUS at 09:04

## 2017-04-11 RX ADMIN — LIDOCAINE HYDROCHLORIDE 100 MG: 20 INJECTION, SOLUTION INTRAVENOUS at 09:04

## 2017-04-11 RX ADMIN — FENTANYL CITRATE 100 MCG: 50 INJECTION, SOLUTION INTRAMUSCULAR; INTRAVENOUS at 09:04

## 2017-04-11 RX ADMIN — LABETALOL HYDROCHLORIDE 5 MG: 5 INJECTION, SOLUTION INTRAVENOUS at 10:04

## 2017-04-11 RX ADMIN — CEFAZOLIN SODIUM 2 G: 2 SOLUTION INTRAVENOUS at 09:04

## 2017-04-11 RX ADMIN — HYDROMORPHONE HYDROCHLORIDE 0.2 MG: 1 INJECTION, SOLUTION INTRAMUSCULAR; INTRAVENOUS; SUBCUTANEOUS at 09:04

## 2017-04-11 RX ADMIN — TRANEXAMIC ACID 1000 MG: 100 INJECTION, SOLUTION INTRAVENOUS at 10:04

## 2017-04-11 RX ADMIN — DEXAMETHASONE SODIUM PHOSPHATE 8 MG: 4 INJECTION, SOLUTION INTRAMUSCULAR; INTRAVENOUS at 09:04

## 2017-04-11 RX ADMIN — ACETAMINOPHEN 1000 MG: 10 INJECTION, SOLUTION INTRAVENOUS at 09:04

## 2017-04-11 RX ADMIN — HYDROMORPHONE HYDROCHLORIDE 0.5 MG: 2 INJECTION, SOLUTION INTRAMUSCULAR; INTRAVENOUS; SUBCUTANEOUS at 11:04

## 2017-04-11 RX ADMIN — OXYCODONE HYDROCHLORIDE 10 MG: 5 TABLET ORAL at 07:04

## 2017-04-11 RX ADMIN — ROCURONIUM BROMIDE 10 MG: 10 INJECTION, SOLUTION INTRAVENOUS at 10:04

## 2017-04-11 RX ADMIN — TRANEXAMIC ACID 1000 MG: 100 INJECTION, SOLUTION INTRAVENOUS at 09:04

## 2017-04-11 RX ADMIN — OXYCODONE HYDROCHLORIDE 10 MG: 5 TABLET ORAL at 02:04

## 2017-04-11 RX ADMIN — GLYCOPYRROLATE 0.6 MG: 0.2 INJECTION, SOLUTION INTRAMUSCULAR; INTRAVENOUS at 10:04

## 2017-04-11 RX ADMIN — ONDANSETRON 4 MG: 2 INJECTION INTRAMUSCULAR; INTRAVENOUS at 05:04

## 2017-04-11 RX ADMIN — NEOSTIGMINE METHYLSULFATE 4 MG: 1 INJECTION INTRAVENOUS at 10:04

## 2017-04-11 RX ADMIN — POTASSIUM CHLORIDE 20 MEQ: 20 TABLET, EXTENDED RELEASE ORAL at 09:04

## 2017-04-11 RX ADMIN — RIVAROXABAN 10 MG: 10 TABLET, FILM COATED ORAL at 05:04

## 2017-04-11 RX ADMIN — SODIUM CHLORIDE, SODIUM LACTATE, POTASSIUM CHLORIDE, AND CALCIUM CHLORIDE: .6; .31; .03; .02 INJECTION, SOLUTION INTRAVENOUS at 04:04

## 2017-04-11 RX ADMIN — MUPIROCIN 1 G: 20 OINTMENT TOPICAL at 09:04

## 2017-04-11 RX ADMIN — ROCURONIUM BROMIDE 25 MG: 10 INJECTION, SOLUTION INTRAVENOUS at 09:04

## 2017-04-11 RX ADMIN — CEFAZOLIN SODIUM 2 G: 2 SOLUTION INTRAVENOUS at 05:04

## 2017-04-11 RX ADMIN — STANDARDIZED SENNA CONCENTRATE AND DOCUSATE SODIUM 1 TABLET: 8.6; 5 TABLET, FILM COATED ORAL at 09:04

## 2017-04-11 RX ADMIN — LIDOCAINE HYDROCHLORIDE 100 MG: 20 INJECTION, SOLUTION INTRAVENOUS at 11:04

## 2017-04-11 RX ADMIN — ONDANSETRON 4 MG: 2 INJECTION, SOLUTION INTRAMUSCULAR; INTRAVENOUS at 09:04

## 2017-04-11 NOTE — H&P
H&P completed on 03/13/2017 has been reviewed, the patient has been examined and:  I concur with the findings and no changes have occurred since H&P was written.    There are no hospital problems to display for this patient.

## 2017-04-11 NOTE — PLAN OF CARE
Problem: Occupational Therapy Goal  Goal: Occupational Therapy Goal  Goals to be met by: 5/11/17     Patient will increase functional independence with ADLs by performing:    LE Dressing with Minimal Assistance.  Grooming while standing with Supervision.  Toileting from toilet with Supervision for hygiene and clothing management.   Supine to sit with Supervision.  Stand pivot transfers with Supervision.  Toilet transfer to toilet with Supervision.  Outcome: Ongoing (interventions implemented as appropriate)  Pt would benefit from cont OT services in order to maximize functional independence. Recommending HHOT/PT at d/c

## 2017-04-11 NOTE — ANESTHESIA PREPROCEDURE EVALUATION
04/11/2017  Mini Sexton is a 56 y.o., female.    [facility] Anesthesia Evaluation     I have reviewed the Nursing Notes.   I have reviewed the Medications.     Review of Systems  Anesthesia Hx:  No problems with previous Anesthesia Denies Hx of Anesthetic complications Denies Family Hx of Anesthesia complications.    Social:  Non-Smoker, No Alcohol Use    Hematology/Oncology:  Hematology Normal   Oncology Normal     EENT/Dental:EENT/Dental Normal   Cardiovascular:   Exercise tolerance: good Hypertension, well controlled  Functional Capacity good / => 4 METS    Pulmonary:  Pulmonary Normal    Renal/:  Renal/ Normal     Hepatic/GI:  Hepatic/GI Normal    Musculoskeletal:   Arthritis     Neurological:  Neurology Normal    Endocrine:  Endocrine Normal        Physical Exam  General:  Well nourished    Airway/Jaw/Neck:  Airway Findings: Mouth Opening: Normal Tongue: Normal  General Airway Assessment: Adult  Mallampati: II  TM Distance: Normal, at least 6 cm  Jaw/Neck Findings:     Neck ROM: Normal ROM      Dental:  Dental Findings: In tact   Chest/Lungs:  Chest/Lungs Findings: Clear to auscultation     Heart/Vascular:  Heart Findings: Rate: Normal  Rhythm: Regular Rhythm        Mental Status:  Mental Status Findings:  Cooperative, Alert and Oriented         Anesthesia Plan  Type of Anesthesia, risks & benefits discussed:  Anesthesia Type:  general, spinal  Patient's Preference: general  Intra-op Monitoring Plan:   Intra-op Monitoring Plan Comments:   Post Op Pain Control Plan:   Post Op Pain Control Plan Comments:   Induction:   IV  Beta Blocker:         Informed Consent: Patient understands risks and agrees with Anesthesia plan.  Questions answered. Anesthesia consent signed with patient.  ASA Score: 2     Day of Surgery Review of History & Physical:            Ready For Surgery From Anesthesia Perspective.

## 2017-04-11 NOTE — PT/OT/SLP EVAL
Occupational Therapy  Evaluation/Treatment     Mini Sexton   MRN: [id number]   Admitting Diagnosis: Degenerative joint disease (DJD) of hip    OT Date of Treatment: 17   OT Start Time: 1602  OT Stop Time: 1630  OT Total Time (min): 28 min    Billable Minutes:  Evaluation 10  Therapeutic Activity 18    Diagnosis: Degenerative joint disease (DJD) of hip   The encounter diagnosis was Degenerative joint disease (DJD) of hip.      Past Medical History:   Diagnosis Date    Arthritis     Hypertension     PONV (postoperative nausea and vomiting)       Past Surgical History:   Procedure Laterality Date     SECTION      x 3    CHOLECYSTECTOMY  2002    HIP ARTHROPLASTY Right 2016    HYSTERECTOMY  2001    TONSILLECTOMY             General Precautions: Standard, fall  Orthopedic Precautions: LLE anterior precautions, LLE weight bearing as tolerated  Braces:            Patient History:  Living Environment  Lives With: spouse  Living Arrangements: house  Living Environment Comment: no steps, walk in shower. Access to , SPC, BSC. Mod I as PLOF. Was ambulating occassionally with cane   Equipment Currently Used at Home: walker, rolling, bedside commode, cane, straight    Prior level of function:   Bed Mobility/Transfers: independent  Grooming: independent  Bathing: independent  Upper Body Dressing: independent  Lower Body Dressing: independent  Toileting: independent  Home Management Skills: independent  Driving License: Yes  Mode of Transportation: Car     Dominant hand: right    Subjective:  Communicated with nsg prior to session.    Chief Complaint: nausea, pain   Patient/Family stated goals: return home     Pain Ratin/10  Location - Side: Left     Location: hip          Objective:       Cognitive Exam:  Oriented to: Person, Place, Time and Situation  Follows Commands/attention: Follows multistep  commands  Communication: clear/fluent  Memory:  No Deficits noted  Safety awareness/insight to  disability: intact  Coping skills/emotional control: Appropriate to situation    Visual/perceptual:  Intact    Physical Exam:  Postural examination/scapula alignment: Rounded shoulder  Skin integrity: Wound L hip  Edema: none noted; surgical swelling     Sensation:   Intact    Upper Extremity Range of Motion:  Right Upper Extremity: WFL  Left Upper Extremity: WFL    Upper Extremity Strength:  Right Upper Extremity: WFL  Left Upper Extremity: WFL   Strength: good    Fine motor coordination:   Intact        Functional Mobility:  Bed Mobility:  Scooting/Bridging: Contact Guard Assistance  Supine to Sit: Contact Guard Assistance, Minimum Assistance  Sit to Supine: Minimum Assistance, Contact Guard Assistance    Transfers:  Sit <> Stand Assistance: Contact Guard Assistance  Sit <> Stand Assistive Device: Rolling Walker    Functional Ambulation: CGA-SBA with RW; see PT note for distance     Activities of Daily Living:    LE Dressing Level of Assistance: Maximum assistance    Grooming Position: Seated  Grooming Level of Assistance: Modified independent           Balance:   Static Sit: NORMAL: No deviations seen in posture held statically  Dynamic Sit: GOOD+: Maintains balance through MAXIMAL excursions of active trunk motion  Static Stand: FAIR: Maintains without assist but unable to take challenges  Dynamic stand: FAIR: Needs CONTACT GUARD during gait    Therapeutic Activities and Exercises:  Sitting balance/tolerance EOB; education on anterior hip precautions; functional mobility in room and hallway; static standing balance/dynamic standing balance; G&H axs sitting EOB     AM-PAC 6 CLICK ADL  How much help from another person does this patient currently need?  1 = Unable, Total/Dependent Assistance  2 = A lot, Maximum/Moderate Assistance  3 = A little, Minimum/Contact Guard/Supervision  4 = None, Modified [place]/Independent    Putting on and taking off regular lower body clothing? : 2  Bathing (including  "washing, rinsing, drying)?: 3  Toileting, which includes using toilet, bedpan, or urinal? : 3  Putting on and taking off regular upper body clothing?: 3  Taking care of personal grooming such as brushing teeth?: 4  Eating meals?: 4  Total Score: 19    AM-PAC Raw Score CMS "G-Code Modifier Level of Impairment Assistance   6 % Total / Unable   7 - 9 CM 80 - 100% Maximal Assist   10 - 14 CL 60 - 80% Moderate Assist   15 - 19 CK 40 - 60% Moderate Assist   20 - 22 CJ 20 - 40% Minimal Assist   23 CI 1-20% SBA / CGA   24 CH 0% Independent/ Mod I       Patient left supine with all lines intact, call button in reach, bed alarm on, nsg notified and   present    Assessment:  Mini Sexton is a 56 y.o. female with a medical diagnosis of Degenerative joint disease (DJD) of hip and presents with s/p L MIKEY. Pt would benefit from cont OT services in order to maximize functional independence. Recommending HHOT/PT at d/c     Rehab identified problem list/impairments: Rehab identified problem list/impairments: weakness, impaired balance, pain, impaired endurance, impaired self care skills, impaired functional mobilty, gait instability, decreased lower extremity function    Rehab potential is good.    Activity tolerance: Good    Discharge recommendations: Discharge Facility/Level Of Care Needs: home health PT, home health OT     Barriers to discharge: Barriers to Discharge: Decreased caregiver support    Equipment recommendations: none     GOALS:   Occupational Therapy Goals        Problem: Occupational Therapy Goal    Goal Priority Disciplines Outcome Interventions   Occupational Therapy Goal     OT, PT/OT Ongoing (interventions implemented as appropriate)    Description:  Goals to be met by: 5/11/17     Patient will increase functional independence with ADLs by performing:    LE Dressing with Minimal Assistance.  Grooming while standing with Supervision.  Toileting from toilet with Supervision for hygiene and clothing " management.   Supine to sit with Supervision.  Stand pivot transfers with Supervision.  Toilet transfer to toilet with Supervision.                PLAN:  Patient to be seen 5 x/week to address the above listed problems via self-care/home management, therapeutic activities, therapeutic exercises  Plan of Care expires: 05/11/17  Plan of Care reviewed with: patient, spouse         Destiny Donovan, OT  04/11/2017

## 2017-04-11 NOTE — PT/OT/SLP EVAL
Physical Therapy  Evaluation    Mini Sexton   MRN: [id number]   Admitting Diagnosis: Degenerative joint disease (DJD) of hip    PT Received On: 17  PT Start Time: 1602     PT Stop Time: 1630    PT Total Time (min): 28 min       Billable Minutes:  Evaluation 10 and Gait [id number]    Diagnosis: Degenerative joint disease (DJD) of hip  The encounter diagnosis was Degenerative joint disease (DJD) of hip.      Past Medical History:   Diagnosis Date    Arthritis     Hypertension     PONV (postoperative nausea and vomiting)       Past Surgical History:   Procedure Laterality Date     SECTION      x 3    CHOLECYSTECTOMY  2002    HIP ARTHROPLASTY Right 2016    HYSTERECTOMY  2001    TONSILLECTOMY         Referring physician: Pedro  Date referred to PT: 2017    General Precautions: Standard, fall  Orthopedic Precautions: LLE weight bearing as tolerated, LLE anterior precautions   Braces: N/A            Patient History:  Lives With: spouse  Living Arrangements: house  Stair Railings at Home: none  Transportation Available: family or friend will provide  Equipment Currently Used at Home: walker, rolling, bedside commode, cane, straight  DME owned (not currently used): bedside commode    Previous Level of Function:  Ambulation Skills: needs device  Transfer Skills: independent  ADL Skills: independent    Subjective:  Communicated with primary nurse prior to session.    Chief Complaint: weakness and nausea  Patient goals: go home    Pain Ratin/10   Location - Side: Left     Location: hip  Pain Addressed: Nurse notified  Pain Rating Post-Intervention: 3/10    Objective:   Patient found with: SCD     Cognitive Exam:  Oriented to: Person, Place, Time and Situation    Follows Commands/attention: Follows two-step commands and Follows multistep  commands  Communication: clear/fluent  Safety awareness/insight to disability: intact    Physical Exam:  Postural examination/scapula alignment: Rounded  shoulder    Skin integrity: Visible skin intact  Edema: Mild L hip    Sensation:   Intact    Upper Extremity Range of Motion:  Right Upper Extremity: WFL  Left Upper Extremity: WFL    Upper Extremity Strength:  Right Upper Extremity: WFL  Left Upper Extremity: WFL    Lower Extremity Range of Motion:  Right Lower Extremity: WFL  Left Lower Extremity: limited active and passive    Lower Extremity Strength:  Right Lower Extremity: WFL  Left Lower Extremity: isolated mvts intact fair control with transitional mvts     Fine motor coordination:  Intact    Gross motor coordination: WFL    Functional Mobility:  Bed Mobility:  Supine to Sit: Minimum Assistance  Sit to Supine: Minimum Assistance, Contact Guard Assistance    Transfers:  Sit <> Stand Assistance: Contact Guard Assistance  Sit <> Stand Assistive Device: Rolling Walker    Gait:   Gait Distance: 50 ft   Assistance 1: Contact Guard Assistance, Stand by Assistance  Gait Assistive Device: Rolling walker  Gait Pattern: 3-point gait  Gait Deviation(s): decreased vania, decreased weight-shifting ability    Stairs:  na    Balance:   Static Sit: GOOD-: Takes MODERATE challenges from all directions but inconsistently  Dynamic Sit: FAIR+: Maintains balance through MINIMAL excursions of active trunk motion  Static Stand: FAIR+: Takes MINIMAL challenges from all directions  Dynamic stand: FAIR: Needs CONTACT GUARD during gait    Therapeutic Activities and Exercises:  Instructed in ankle pumps, qs and gluteal sets    AM-PAC 6 CLICK MOBILITY  How much help from another person does this patient currently need?   1 = Unable, Total/Dependent Assistance  2 = A lot, Maximum/Moderate Assistance  3 = A little, Minimum/Contact Guard/Supervision  4 = None, Modified [place]/Independent    Turning over in bed (including adjusting bedclothes, sheets and blankets)?: 4  Sitting down on and standing up from a chair with arms (e.g., wheelchair, bedside commode, etc.): 4  Moving from  lying on back to sitting on the side of the bed?: 3  Moving to and from a bed to a chair (including a wheelchair)?: 3  Need to walk in hospital room?: 3  Climbing 3-5 steps with a railing?: 3  Total Score: 20     AM-PAC Raw Score CMS G-Code Modifier Level of Impairment Assistance   6 % Total / Unable   7 - 9 CM 80 - 100% Maximal Assist   10 - 14 CL 60 - 80% Moderate Assist   15 - 19 CK 40 - 60% Moderate Assist   20 - 22 CJ 20 - 40% Minimal Assist   23 CI 1-20% SBA / CGA   24 CH 0% Independent/ Mod I     Patient left HOB elevated with call button in reach and spouse present.    Assessment:   Mini Sexton is a 56 y.o. female with a medical diagnosis of Degenerative joint disease (DJD) of hip and presents with weakness, gait disturbance and decline from PLOf 2/2 s/p MIKEY.    Rehab identified problem list/impairments: Rehab identified problem list/impairments: weakness, gait instability, impaired self care skills, impaired functional mobilty, decreased lower extremity function, decreased ROM, orthopedic precautions    Rehab potential is good.    Activity tolerance: Fair    Discharge recommendations: Discharge Facility/Level Of Care Needs: home with home health, home health PT, home health OT     Barriers to discharge: Barriers to Discharge: None    Equipment recommendations: Equipment Needed After Discharge: none     GOALS:   Physical Therapy Goals        Problem: Physical Therapy Goal    Goal Priority Disciplines Outcome Goal Variances Interventions   Physical Therapy Goal     PT/OT, PT Ongoing (interventions implemented as appropriate)     Description:  Goals to be met by: 2017     Patient will increase functional independence with mobility by performin. Supine to sit with Modified [place]  2. Sit to stand transfer with Modified [place]  3. Gait  x 200 feet with Modified [place] using Rolling Walker.                 PLAN:    Patient to be seen BID to address the above listed problems  via gait training, therapeutic activities, therapeutic exercises  Plan of Care expires: 05/11/17  Plan of Care reviewed with: patient, spouse          Clayton Grijalva, PT  04/11/2017

## 2017-04-11 NOTE — OP NOTE
DATE OF PROCEDURE:  04/11/2017.    CURRENT TIME:  11:12 a.m.    PREOPERATIVE DIAGNOSES:  1.  DJD of left hip.  2.  Postop right total hip arthroplasty.    PROCEDURE:  Left total hip arthroplasty.    SURGEON:  Len Vasquez M.D.    ANESTHESIA:  General endotracheal.    ESTIMATED BLOOD LOSS:  150 mL.    SPECIMENS:  None.    COMPLICATIONS:  None.    INDICATIONS:  Ms. Sexton is 56, previously underwent right MIKEY for advanced DJD   and is admitted for left MIKEY for advanced DJD.  She has failed conservative   measures including activity modification, ambulation with assisted devices,   nonsteroidals and physical therapy.  Surgical site marking was performed in the   holding area along with informed consent and timeout was performed in the   Operating Room prior to making skin incision.    PROCEDURE IN DETAIL:  The patient was taken to the Operating Room, placed on the   operating table in supine position.  After an adequate level of anesthesia was   obtained, the patient was turned to the right lateral decubitus position with   the left hip up.  Preoperative prophylactic IV Ancef and ____ were given.    Timeout was performed.  The hip was prepped and draped and standard anterior   lateral exposure of hip was made sharply with the scalpel and carried sharply   through subcutaneous tissue.  The anterior portion of the abductor mechanism was   split off the bone extending into a portion of the vastus lateralis and just   the anterior portion of the gluteus medius extending 1 cm into the muscular   portion of the gluteus medius.  Gluteus minimus was left in place and retracted   posteriorly.  The underlying capsule was exposed and capsulotomy was performed   and the capsule was spared and later repaired.  The hip was dislocated and   femoral neck cut was made at the appropriate level.  Next, deep retractors were   inserted about the acetabulum and acetabular labrum was resected and then the   acetabulum was first reamed  deepening the acetabulum and then reamed starting   with a 42-mm reamer and then reaming every 2 mm up to 50 mm and then a 51-mm   reamer was used for the last reaming.  Next, a 3-hole cluster porous coated   acetabular shell was impacted in place in 45 degrees of abduction and 25 degrees   of anteversion.  Due to poor bone quality, 2 screw holes were filled with   6.5-mm cancellous bone screws inserted through the lateral and anterolateral   screw holes.  Care was taken to make sure the screws were maintained essentially   within bone.  Next, the acetabular trial liner was inserted.  The hip was   brought in figure-of-four position.  Femoral canal was opened and lateralized   and reamed sequentially up to size 5 and then broached with broaching stopping   at size 4 as this filled the proximal canal and was stable.  The hip was then   trialed with the broach in place and was most stable and balanced with a +5 mm   neck and a 36-mm head.  The hip was stable and balanced in all positions and had   approximately 1 cm of distal shock.  Trial components were all then removed.    The hole eliminator was inserted and then the +4 offset polyethylene liner with   a 10-degree villalobos anterior-superior was impacted in place.  The hip was once   again brought in figure-of-four position and the size 4 proximally porous coated   femoral stem was impacted in place to stable position.  The hip was once again   trialed with a 36-mm head with a +5 neck and was stable and balanced with this   in place.  The Farrell taper was then cleaned and dried and then the ceramic   femoral head with a +5 neck was impacted on to the cleaned and dried Farrell taper   and the hip was reduced.  The wound was then copiously irrigated.  The capsule   was repaired in a side-to-side fashion first with #1 Vicryl and with #2   Orthocord.  The abductor mechanism was then repaired in an interrupted   figure-of-eight fashion with #2 Orthocord.  Subcu was closed in  layers with 2-0   Vicryl and then 3-0 Monocryl in a running subcuticular fashion and then   Dermabond with Prineo tape was applied over the incision and sterile dressing   applied.  The patient was returned to supine position, awakened and taken to   Recovery Room in stable condition.  No complications.    PLAN:  Ice, early mobilization, weightbearing as tolerated with a walker, pain   control, Xarelto and SCDs for DVT prophylaxis.    COMPONENTS UTILIZED:  DePuy [place] femoral stem proximally porous coated size 4   [place] ALTRX polyethylene acetabular liner, +4 offset, 10-degree villalobos, 36 mm   inside diameter, 52 mm outside diameter, [place] acetabular shell 52 mm outside   diameter, Biolox delta taper ceramic femoral head revision 36 mm with +5 offset   and 12/14 taper and two 6.5 mm x 20 mm cancellous bone screw.      PETE  dd: 04/11/2017 11:20:33 (CDT)  td: 04/11/2017 15:00:52 (CDT)  Doc ID   #[id number]  Job ID #[id number]    CC:

## 2017-04-11 NOTE — BRIEF OP NOTE
[facility]  Brief Operative Note    SUMMARY     Surgery Date: 4/11/2017     Surgeon(s) and Role:     * Len Vasquez MD - Primary    Assisting Surgeon: None    Pre-op Diagnosis:  Arthritis of left hip [M16.12]    Post-op Diagnosis:  Post-Op Diagnosis Codes:     * Arthritis of left hip [M16.12]    Procedure(s) (LRB):  ARTHROPLASTY-HIP (Left)    Anesthesia: General    Description of Procedure: Lt MIKEY    Description of the findings of the procedure: Advanced DJD Lt hip    Estimated Blood Loss: 150 mL         Specimens:   Specimen     None

## 2017-04-11 NOTE — TRANSFER OF CARE
"Anesthesia Transfer of Care Note    Patient: Mini Sexton    Procedure(s) Performed: Procedure(s) (LRB):  ARTHROPLASTY-HIP (Left)    Patient location: PACU    Anesthesia Type: general    Transport from OR: Transported from OR on room air with adequate spontaneous ventilation    Post pain: adequate analgesia    Post assessment: no apparent anesthetic complications and tolerated procedure well    Post vital signs: stable    Level of consciousness: awake, alert and oriented    Nausea/Vomiting: no nausea/vomiting    Complications: none          Last vitals:   Visit Vitals    /81 (BP Location: Right arm, Patient Position: Lying, BP Method: Automatic)    Pulse 74    Temp 36.9 °C (98.4 °F) (Oral)    Resp 17    Ht 5' 2" (1.575 m)    Wt 76.7 kg (169 lb)    SpO2 95%    Breastfeeding No    BMI 30.91 kg/m2     "

## 2017-04-11 NOTE — ANESTHESIA POSTPROCEDURE EVALUATION
"Anesthesia Post Evaluation    Patient: Mini Sexton    Procedure(s) Performed: Procedure(s) (LRB):  ARTHROPLASTY-HIP (Left)    Final Anesthesia Type: general  Patient location during evaluation: PACU  Patient participation: Yes- Able to Participate  Level of consciousness: awake and alert  Post-procedure vital signs: reviewed and stable  Pain management: adequate  Airway patency: patent  PONV status at discharge: No PONV  Anesthetic complications: no      Cardiovascular status: hemodynamically stable  Respiratory status: unassisted  Hydration status: euvolemic  Follow-up not needed.        Visit Vitals    /75 (Patient Position: Lying, BP Method: Automatic)    Pulse 63    Temp 36.8 °C (98.2 °F) (Oral)    Resp 19    Ht 5' 2" (1.575 m)    Wt 76.7 kg (169 lb)    SpO2 97%    Breastfeeding No    BMI 30.91 kg/m2       Pain/Francois Score: Pain Assessment Performed: Yes (4/11/2017  2:15 PM)  Presence of Pain: complains of pain/discomfort (4/11/2017  2:15 PM)  Pain Rating Prior to Med Admin: 2 (4/11/2017  2:15 PM)  Pain Rating Post Med Admin: 5 (4/11/2017 12:40 PM)  Francois Score: 9 (4/11/2017  2:15 PM)      "

## 2017-04-11 NOTE — IP AVS SNAPSHOT
[facility]  [address]  [place] [address]  Phone: [phone]           Patient Discharge Instructions   Our goal is to set you up for success. This packet includes information on your condition, medications, and your home care.  It will help you care for yourself to prevent having to return to the hospital.     Please ask your nurse if you have any questions.      There are many details to remember when preparing to leave the hospital. Here is what you will need to do:    1. Take your medicine. If you are prescribed medications, review your Medication List on the following pages. You may have new medications to  at the pharmacy and others that you'll need to stop taking. Review the instructions for how and when to take your medications. Talk with your doctor or nurses if you are unsure of what to do.     2. Go to your follow-up appointments. Specific follow-up information is listed in the following pages. Your may be contacted by a nurse or clinical provider about future appointments. Be sure we have all of the phone numbers to reach you. Please contact your provider's office if you are unable to make an appointment.     3. Watch for warning signs. Your doctor or nurse will give you detailed warning signs to watch for and when to call for assistance. These instructions may also include educational information about your condition. If you experience any of warning signs to your health, call your doctor.               ** Verify the list of medication(s) below is accurate and up to date. Carry this with you in case of emergency. If your medications have changed, please notify your healthcare provider.             Medication List      START taking these medications        Additional Info                      ondansetron 4 MG Tbdl   Commonly known as:  ZOFRAN-ODT   Quantity:  10 tablet   Refills:  2   Dose:  4 mg    Instructions:  Take 1 tablet (4 mg total) by mouth every 8 (eight) hours  as needed.     Begin Date    AM    Noon    PM    Bedtime       oxycodone-acetaminophen  mg per tablet   Commonly known as:  PERCOCET   Quantity:  60 tablet   Refills:  0   Dose:  1 tablet    Instructions:  Take 1 tablet by mouth every 6 (six) hours as needed for Pain.     Begin Date    AM    Noon    PM    Bedtime       rivaroxaban 10 mg Tab   Commonly known as:  XARELTO   Quantity:  28 tablet   Refills:  0   Dose:  10 mg    Last time this was given:  10 mg on 4/12/2017  9:06 AM   Instructions:  Take 1 tablet (10 mg total) by mouth once daily.     Begin Date    AM    Noon    PM    Bedtime       senna-docusate 8.6-50 mg 8.6-50 mg per tablet   Commonly known as:  PERICOLACE   Refills:  0   Dose:  1 tablet    Last time this was given:  1 tablet on 4/12/2017  9:07 AM   Instructions:  Take 1 tablet by mouth 2 (two) times daily.     Begin Date    AM    Noon    PM    Bedtime         CONTINUE taking these medications        Additional Info                      aspirin-acetaminophen-caffeine 250-250-65 mg 250-250-65 mg per tablet   Commonly known as:  EXCEDRIN MIGRAINE   Refills:  0   Dose:  1 tablet    Instructions:  Take 1 tablet by mouth every 6 (six) hours as needed for Pain.     Begin Date    AM    Noon    PM    Bedtime       atorvastatin 40 MG tablet   Commonly known as:  LIPITOR   Refills:  0   Dose:  40 mg    Last time this was given:  40 mg on 4/12/2017  9:06 AM   Instructions:  Take 40 mg by mouth once daily.     Begin Date    AM    Noon    PM    Bedtime       B-complex with vitamin C tablet   Commonly known as:  Z-Bec or Equiv   Refills:  0   Dose:  1 tablet    Instructions:  Take 1 tablet by mouth once daily.     Begin Date    AM    Noon    PM    Bedtime       biotin 10,000 mcg Tbdl   Refills:  0    Instructions:  Take by mouth.     Begin Date    AM    Noon    PM    Bedtime       losartan-hydrochlorothiazide 100-25 mg 100-25 mg per tablet   Commonly known as:  HYZAAR   Refills:  0   Dose:  1 tablet     Instructions:  Take 1 tablet by mouth once daily.     Begin Date    AM    Noon    PM    Bedtime       potassium chloride SA 10 MEQ tablet   Commonly known as:  K-DUR,KLOR-CON   Refills:  0   Dose:  20 mEq    Last time this was given:  20 mEq on 4/12/2017  9:07 AM   Instructions:  Take 20 mEq by mouth 2 (two) times daily.     Begin Date    AM    Noon    PM    Bedtime       VITAMIN D-3 ORAL   Refills:  0   Dose:  2000 mg    Instructions:  Take 2,000 mg by mouth once daily.     Begin Date    AM    Noon    PM    Bedtime            Where to Get Your Medications      You can get these medications from any pharmacy     You don't need a prescription for these medications     senna-docusate 8.6-50 mg 8.6-50 mg per tablet         Information about where to get these medications is not yet available     ! Ask your nurse or doctor about these medications     ondansetron 4 MG Tbdl    oxycodone-acetaminophen  mg per tablet    rivaroxaban 10 mg Tab                  Please bring to all follow up appointments:    1. A copy of your discharge instructions.  2. All medicines you are currently taking in their original bottles.  3. Identification and insurance card.    Please arrive 15 minutes ahead of scheduled appointment time.    Please call 24 hours in advance if you must reschedule your appointment and/or time.        Follow-up Information     Follow up with Len Vasquez MD In 2 weeks.    Specialty:  Orthopedic Surgery    Why:  For suture removal, For wound re-check    Contact information:    [address]  [address]  [place] [address] [phone]          Follow up with Len Vasquze MD In 2 weeks.    Specialty:  Orthopedic Surgery    Contact information:    [address]  [address]  [place] [address] [phone]          Follow up with Len Vasquez MD In 2 weeks.    Specialty:  Orthopedic Surgery    Why:  For suture removal, For wound re-check    Contact information:    [address]  [address]  [facility] [id number] [phone]          Follow up with [facility] [place] .    Specialties:  Home Health Services, Physical Therapy, Occupational Therapy    Contact information:    [facility] [address]  [address]  [place]  [phone]        Referrals     Future Orders    Referral to Home health         Discharge Instructions     Future Orders    Call MD for:  difficulty breathing, headache or visual disturbances     Call MD for:  extreme fatigue     Call MD for:  hives     Call MD for:  persistent dizziness or light-headedness     Call MD for:  persistent nausea and vomiting     Call MD for:  redness, tenderness, or signs of infection (pain, swelling, redness, odor or green/yellow discharge around incision site)     Call MD for:  severe uncontrolled pain     Call MD for:  temperature >100.4     Diet general     Questions:    Total calories:      Fat restriction, if any:      Protein restriction, if any:      Na restriction, if any:      Fluid restriction:      Additional restrictions:      No dressing needed     No driving, operating heavy equipment or signing legal documents while taking pain medication     Shower on day dressing removed (No bath)     Comments:    On 4/13/2017    Weight bearing as tolerated       Discharge References/Attachments     HIP PRECAUTIONS (ENGLISH)    HIP REPLACEMENT, AFTER: HOME SAFETY (ENGLISH)        Primary Diagnosis     Your primary diagnosis was:  Osteoarthritis (Arthritis Due To Wear And Tear Of Joints)      Admission Information     Date & Time Provider Department CSN    4/11/2017  6:10 AM Len Vasquez MD [facility] [id number]      Care Providers     Provider Role Specialty Primary office phone    Len Vasquez MD Attending Provider Orthopedic Surgery [phone]    Len Vasquez MD Surgeon  Orthopedic Surgery [phone]      Your Vitals Were     BP Pulse Temp Resp Height Weight    120/70 (Patient Position:  "Lying, BP Method: Automatic) 88 99.9 °F (37.7 °C) (Oral) 19 5' 2" (1.575 m) 76.7 kg (169 lb)    SpO2 BMI             97% 30.91 kg/m2         Recent Lab Values     No lab values to display.      Allergies as of 4/12/2017     No Known Allergies      [facility]     [facility] Nurse Care Line - 24/7 Assistance  Unless otherwise directed by your provider, please contact [facility], our nurse care line that is available for 24/7 assistance.     Registered nurses in the [facility] provide clinical advisement, health education, appointment booking, and other advisory services.  Call for this free service at [phone].        Advance Directives     An advance directive is a document which, in the event you are no longer able to make decisions for yourself, tells your healthcare team what kind of treatment you do or do not want to receive, or who you would like to make those decisions for you.  If you do not currently have an advance directive, [facility] encourages you to create one.  For more information call:  [phone]), [phone] ([phone]),  or log on to [email or web address].        Language Assistance Services     ATTENTION: Language assistance services are available, free of charge. Please call [phone].      ATENCIÓN: Si habla español, tiene a north disposición servicios gratuitos de asistencia lingüística. Llame al [phone].     CHÚ Ý: N?u b?n nói Ti?ng Vi?t, có các d?ch v? h? tr? ngôn ng? mi?n phí dành cho b?n. G?i s? [phone].        [facility] Information           [facility] Sign-Up     Activating your [facility] account is as easy as 1-2-3!     1) Visit [email or web address], select Sign Up Now, enter this activation code and your date of birth, then select Next.  [email or web address]  Expires: 5/13/2017 10:30 AM      2) Create a username and password to use when you visit [facility] in the future and select a security question in case you lose your " password and select Next.    3) Enter your e-mail address and click Sign Up!    Additional Information  If you have questions, please e-mail [email or web address] or call [phone] to talk to our [facility] staff. Remember, [facility] is NOT to be used for urgent needs. For medical emergencies, dial 911.          [facility] complies with applicable Federal civil rights laws and does not discriminate on the basis of race, color, national origin, age, disability, or sex.

## 2017-04-12 VITALS
OXYGEN SATURATION: 98 % | RESPIRATION RATE: 19 BRPM | BODY MASS INDEX: 31.1 KG/M2 | HEIGHT: 62 IN | WEIGHT: 169 LBS | TEMPERATURE: 100 F | SYSTOLIC BLOOD PRESSURE: 120 MMHG | HEART RATE: 88 BPM | DIASTOLIC BLOOD PRESSURE: 70 MMHG

## 2017-04-12 PROBLEM — Z96.649 STATUS POST HIP REPLACEMENT: Status: ACTIVE | Noted: 2017-04-12

## 2017-04-12 PROBLEM — D64.9 ANEMIA: Status: ACTIVE | Noted: 2017-04-12

## 2017-04-12 LAB
ERYTHROCYTE [DISTWIDTH] IN BLOOD BY AUTOMATED COUNT: 14.2 %
HCT VFR BLD AUTO: 32.1 %
HGB BLD-MCNC: 10.7 G/DL
MCH RBC QN AUTO: 27 PG
MCHC RBC AUTO-ENTMCNC: 33.3 %
MCV RBC AUTO: 81 FL
PLATELET # BLD AUTO: 257 K/UL
PMV BLD AUTO: 9.5 FL
RBC # BLD AUTO: 3.97 M/UL
WBC # BLD AUTO: 12.31 K/UL

## 2017-04-12 PROCEDURE — 63600175 PHARM REV CODE 636 W HCPCS

## 2017-04-12 PROCEDURE — 94761 N-INVAS EAR/PLS OXIMETRY MLT: CPT

## 2017-04-12 PROCEDURE — 97530 THERAPEUTIC ACTIVITIES: CPT

## 2017-04-12 PROCEDURE — 97110 THERAPEUTIC EXERCISES: CPT

## 2017-04-12 PROCEDURE — 25000003 PHARM REV CODE 250

## 2017-04-12 PROCEDURE — 97116 GAIT TRAINING THERAPY: CPT

## 2017-04-12 PROCEDURE — 36415 COLL VENOUS BLD VENIPUNCTURE: CPT

## 2017-04-12 PROCEDURE — 85027 COMPLETE CBC AUTOMATED: CPT

## 2017-04-12 PROCEDURE — 97535 SELF CARE MNGMENT TRAINING: CPT

## 2017-04-12 RX ORDER — OXYCODONE AND ACETAMINOPHEN 10; 325 MG/1; MG/1
1 TABLET ORAL EVERY 6 HOURS PRN
Qty: 60 TABLET | Refills: 0
Start: 2017-04-12

## 2017-04-12 RX ORDER — AMOXICILLIN 250 MG
1 CAPSULE ORAL 2 TIMES DAILY
COMMUNITY
Start: 2017-04-12

## 2017-04-12 RX ORDER — ONDANSETRON 4 MG/1
4 TABLET, ORALLY DISINTEGRATING ORAL EVERY 8 HOURS PRN
Qty: 10 TABLET | Refills: 2
Start: 2017-04-12

## 2017-04-12 RX ADMIN — OXYCODONE HYDROCHLORIDE 10 MG: 5 TABLET ORAL at 09:04

## 2017-04-12 RX ADMIN — POTASSIUM CHLORIDE 20 MEQ: 20 TABLET, EXTENDED RELEASE ORAL at 09:04

## 2017-04-12 RX ADMIN — MUPIROCIN 1 G: 20 OINTMENT TOPICAL at 09:04

## 2017-04-12 RX ADMIN — Medication 1 CAPSULE: at 09:04

## 2017-04-12 RX ADMIN — CEFAZOLIN SODIUM 2 G: 2 SOLUTION INTRAVENOUS at 12:04

## 2017-04-12 RX ADMIN — STANDARDIZED SENNA CONCENTRATE AND DOCUSATE SODIUM 1 TABLET: 8.6; 5 TABLET, FILM COATED ORAL at 09:04

## 2017-04-12 RX ADMIN — HYDROCHLOROTHIAZIDE 25 MG: 25 TABLET ORAL at 09:04

## 2017-04-12 RX ADMIN — VITAMIN D, TAB 1000IU (100/BT) 5000 UNITS: 25 TAB at 09:04

## 2017-04-12 RX ADMIN — RIVAROXABAN 10 MG: 10 TABLET, FILM COATED ORAL at 09:04

## 2017-04-12 RX ADMIN — ATORVASTATIN CALCIUM 40 MG: 10 TABLET, FILM COATED ORAL at 09:04

## 2017-04-12 RX ADMIN — POLYETHYLENE GLYCOL 3350 17 G: 17 POWDER, FOR SOLUTION ORAL at 09:04

## 2017-04-12 RX ADMIN — LOSARTAN POTASSIUM 100 MG: 50 TABLET, FILM COATED ORAL at 09:04

## 2017-04-12 NOTE — PT/OT/SLP PROGRESS
Occupational Therapy  Treatment/Dc Summary     Mini Sexton   MRN: [id number]   Admitting Diagnosis: Degenerative joint disease (DJD) of hip    OT Date of Treatment: 17   OT Start Time: 1003  OT Stop Time: 1033  OT Total Time (min): 30 min    Billable Minutes:  Self Care/Home Management 20 and Therapeutic Activity 10    General Precautions: Standard, fall  Orthopedic Precautions: LLE weight bearing as tolerated, LLE anterior precautions  Braces:           Subjective:  Communicated with nsg prior to session.      Pain Ratin/10  Location - Side: Left     Location: hip          Objective:        Functional Mobility:  Bed Mobility:  Scooting/Bridging: Contact Guard Assistance  Supine to Sit: Contact Guard Assistance    Transfers:   Sit <> Stand Assistance: Contact Guard Assistance, Stand By Assistance  Sit <> Stand Assistive Device: Rolling Walker  Bed <> Chair Technique: Stand Pivot  Bed <> Chair Transfer Assistance: Stand By Assistance, Contact Guard Assistance  Bed <> Chair Assistive Device: Rolling Walker  Toilet Transfer Technique: Stand Pivot  Toilet Transfer Assistance: Contact Guard Assistance, Stand By Assistance  Toilet Transfer Assistive Device: Rolling Walker    Functional Ambulation: SBA- CGA with RW     Activities of Daily Living:  LE Dressing Level of Assistance: Moderate assistance  Grooming Position: Standing  Grooming Level of Assistance: Stand by assistance  Toileting Where Assessed: Toilet  Toileting Level of Assistance: Stand by assistance, Contact guard       Balance:   Static Sit: GOOD: Takes MODERATE challenges from all directions  Dynamic Sit: GOOD: Maintains balance through MODERATE excursions of active trunk movement  Static Stand: FAIR+: Takes MINIMAL challenges from all directions  Dynamic stand: FAIR+: Needs CLOSE SUPERVISION during gait and is able to right self with minor LOB    Therapeutic Activities and Exercises:  Standing at sink for G&H axs; multiple trials functional  transitions from seated surfaces (EOB, toilet, b/s chair), functional mobility in room     AM-PAC 6 CLICK ADL   How much help from another person does this patient currently need?   1 = Unable, Total/Dependent Assistance  2 = A lot, Maximum/Moderate Assistance  3 = A little, Minimum/Contact Guard/Supervision  4 = None, Modified [place]/Independent    Putting on and taking off regular lower body clothing? : 2  Bathing (including washing, rinsing, drying)?: 3  Toileting, which includes using toilet, bedpan, or urinal? : 3  Putting on and taking off regular upper body clothing?: 4  Taking care of personal grooming such as brushing teeth?: 4  Eating meals?: 4  Total Score: 20     AM-PAC Raw Score CMS G-Code Modifier Level of Impairment Assistance   6 % Total / Unable   7 - 9 CM 80 - 100% Maximal Assist   10 - 14 CL 60 - 80% Moderate Assist   15 - 19 CK 40 - 60% Moderate Assist   20 - 22 CJ 20 - 40% Minimal Assist   23 CI 1-20% SBA / CGA   24 CH 0% Independent/ Mod I     Patient left up in chair with all lines intact, call button in reach and nsg notified    ASSESSMENT:  Mini Sexton is a 56 y.o. female with a medical diagnosis of Degenerative joint disease (DJD) of hip and presents with s/p L MIKEY. Pt progressing well towards OT goals. Pt requiring increased assist for ADLs, however, will have assist at home if required. Per chart, pt to d/c this afternoon with HHOT/PT. Will d/c OT at this time     Rehab identified problem list/impairments: Rehab identified problem list/impairments: weakness, impaired balance, pain, impaired endurance, impaired self care skills, impaired functional mobilty, gait instability, decreased lower extremity function, orthopedic precautions    Rehab potential is good.    Activity tolerance: Good    Discharge recommendations: Discharge Facility/Level Of Care Needs: home health PT, home health OT     Barriers to discharge: Barriers to Discharge: None    Equipment recommendations:  none     GOALS:   Occupational Therapy Goals        Problem: Occupational Therapy Goal    Goal Priority Disciplines Outcome Interventions   Occupational Therapy Goal     OT, PT/OT Unable to achieve outcome(s) by discharge    Description:  Goals to be met by: 5/11/17     Patient will increase functional independence with ADLs by performing:    LE Dressing with Minimal Assistance.  Grooming while standing with Supervision.  Toileting from toilet with Supervision for hygiene and clothing management.   Supine to sit with Supervision.  Stand pivot transfers with Supervision.  Toilet transfer to toilet with Supervision.                Plan:  Patient to be seen 5 x/week to address the above listed problems via self-care/home management, therapeutic activities, therapeutic exercises  Plan of Care expires: 05/11/17  Plan of Care reviewed with: patient         Destiny Donovan, OT  04/12/2017

## 2017-04-12 NOTE — PLAN OF CARE
Follow-up With  Details  Why  Contact Info   Len Vasquez  On 4/24/2017  For suture removal, For wound re-check  [address]  [address]  [place] [address] [phone]     [place] [facility]  [address]  [address]  [place] [address]  [phone]     Pt has DME in place.  to transport home          04/12/17 1532   Final Note   Assessment Type Final Discharge Note   Discharge Disposition Home   Discharge planning education complete? Yes   What phone number can be called within the next 1-3 days to see how you are doing after discharge? [phone]   Hospital Follow Up  Appt(s) scheduled? Yes   Discharge plans and expectations educations in teach back method with documentation complete? Yes   Offered [facility]? Yes   Discharge/Hospital Encounter Summary to ([facility]) PCP Yes   Referral to Outpatient Case Management complete? n/a   Referral to / orders for Home Health Complete? Yes  ([facility])   30 day supply of medicines given at discharge, if documented non-compliance / non-adherence? n/a   Any social issues identified prior to discharge? n/a   Did you assess the readiness or willingness of the family or caregiver to support self management of care? Yes   Right Care Referral Info   Post Acute Recommendation Home-care   Referral Type    Facility Name [facility]

## 2017-04-12 NOTE — PLAN OF CARE
Problem: Physical Therapy Goal  Goal: Physical Therapy Goal  Goals to be met by: 2017     Patient will increase functional independence with mobility by performin. Supine to sit with Modified [place]  2. Sit to stand transfer with Modified [place]  3. Gait x 200 feet with Modified [place] using Rolling Walker.    Pt progressing towards goals, inc. amb dist, NO LOB, amb'd w/ RW and WBAT on LLE ~100' w/ CGA/SBA. Recommend home w/ HHPT

## 2017-04-12 NOTE — PT/OT/SLP PROGRESS
Physical Therapy  Treatment    Mini Sexton   MRN: [id number]   Admitting Diagnosis: Degenerative joint disease (DJD) of hip    PT Received On: 04/12/17  PT Start Time: 1144     PT Stop Time: 1210    PT Total Time (min): 26 min       Billable Minutes:  Gait [id number]    Treatment Type: Treatment  PT/PTA: PTA     PTA Visit Number: 1       General Precautions: Standard, fall  Orthopedic Precautions: LLE weight bearing as tolerated, LLE anterior precautions   Braces: N/A         Subjective:  Communicated with ns prior to session.  Pt agreeable to tx., only min c/o's pain    Pain Rating: 3/10  Location - Side: Left  Location - Orientation: generalized  Location: hip  Pain Addressed: Reposition, Distraction       Objective:   Patient found with: SCD, peripheral IV    Functional Mobility:  Bed Mobility:        Transfers:  Sit <> Stand Assistance: Contact Guard Assistance, Stand By Assistance  Sit <> Stand Assistive Device: Rolling Walker    Gait:   Gait Distance: 100' w/ RW and CGA/SBA, WBAT on LLE  Assistance 1: Contact Guard Assistance, Stand by Assistance  Gait Assistive Device: Rolling walker  Gait Pattern: 3-point gait  Gait Deviation(s): decreased vania, decreased step length, decreased weight-shifting ability    Stairs:  n/a    Balance:   Static Sit: GOOD: Takes MODERATE challenges from all directions  Dynamic Sit: GOOD: Maintains balance through MODERATE excursions of active trunk movement  Static Stand: FAIR+: Takes MINIMAL challenges from all directions  Dynamic stand: FAIR+: Needs CLOSE SUPERVISION during gait and is able to right self with minor LOB     Therapeutic Activities and Exercises:  Reviewed hip precautions w/ pt able to recall 2/3. Left up in chair for lunch.     AM-PAC 6 CLICK MOBILITY  How much help from another person does this patient currently need?   1 = Unable, Total/Dependent Assistance  2 = A lot, Maximum/Moderate Assistance  3 = A little, Minimum/Contact Guard/Supervision  4 = None,  Modified [place]/Independent    Turning over in bed (including adjusting bedclothes, sheets and blankets)?: 4  Sitting down on and standing up from a chair with arms (e.g., wheelchair, bedside commode, etc.): 4  Moving from lying on back to sitting on the side of the bed?: 3  Moving to and from a bed to a chair (including a wheelchair)?: 3  Need to walk in hospital room?: 3  Climbing 3-5 steps with a railing?: 3  Total Score: 20    AM-PAC Raw Score CMS G-Code Modifier Level of Impairment Assistance   6 % Total / Unable   7 - 9 CM 80 - 100% Maximal Assist   10 - 14 CL 60 - 80% Moderate Assist   15 - 19 CK 40 - 60% Moderate Assist   20 - 22 CJ 20 - 40% Minimal Assist   23 CI 1-20% SBA / CGA   24 CH 0% Independent/ Mod I     Patient left up in chair with all lines intact, call button in reach and ns notified.    Assessment:  Mini Sexton is a 56 y.o. female with a medical diagnosis of Degenerative joint disease (DJD) of hip and presents with inc amb dist today, good pain mgmt..    Rehab identified problem list/impairments: Rehab identified problem list/impairments: weakness, impaired functional mobilty, orthopedic precautions, pain, gait instability, decreased lower extremity function, decreased ROM    Rehab potential is excellent.    Activity tolerance: Excellent    Discharge recommendations: Discharge Facility/Level Of Care Needs: home with home health, home health PT, home health OT     Barriers to discharge: Barriers to Discharge: None    Equipment recommendations: Equipment Needed After Discharge: none     GOALS:   Physical Therapy Goals        Problem: Physical Therapy Goal    Goal Priority Disciplines Outcome Goal Variances Interventions   Physical Therapy Goal     PT/OT, PT Ongoing (interventions implemented as appropriate)     Description:  Goals to be met by: 2017     Patient will increase functional independence with mobility by performin. Supine to sit with Modified  [place]  2. Sit to stand transfer with Modified [place]  3. Gait  x 200 feet with Modified [place] using Rolling Walker.                 PLAN:    Patient to be seen BID  to address the above listed problems via gait training, therapeutic activities, therapeutic exercises  Plan of Care expires: 05/11/17  Plan of Care reviewed with: patient         Yakelin Silva, PTA  04/12/2017

## 2017-04-12 NOTE — PLAN OF CARE
Problem: Patient Care Overview  Goal: Plan of Care Review  Pt on RA SPO2 95%.  No apparent distress.  Will continue to monitor.

## 2017-04-12 NOTE — PLAN OF CARE
Pt known to [place] via [facility]. Has DME in place.       04/12/17 1535   Discharge Assessment   Assessment Type Discharge Planning Assessment   Confirmed/corrected address and phone number on facesheet? Yes   Assessment information obtained from? Patient   Expected Length of Stay (days) 1   Prior to hospitilization cognitive status: Alert/Oriented   Prior to hospitalization functional status: Assistive Equipment   Current cognitive status: Alert/Oriented   Current Functional Status: Assistive Equipment   Arrived From home or self-care   Lives With spouse   Able to Return to Prior Arrangements yes   Is patient able to care for self after discharge? Yes   How many people do you have in your home that can help with your care after discharge? 1   Who are your caregiver(s) and their phone number(s)? spouse nandasp- [phone]   Patient's perception of discharge disposition home or selfcare;home health   Readmission Within The Last 30 Days no previous admission in last 30 days   Patient currently being followed by outpatient case management? No   Patient currently receives home health services? Yes;No   Patient previously received home health services and would like to resume services if necessary? Yes   If yes, name of home health provider: [facility]   Does the patient currently use HME? Yes   Patient currently receives private duty nursing? N/A   Patient currently receives any other outside agency services? No   Equipment Currently Used at Home 3-in-1 commode;walker, rolling   Do you have any problems affording any of your prescribed medications? No   Is the patient taking medications as prescribed? yes   Do you have any financial concerns preventing you from receiving the healthcare you need? No   Does the patient have transportation to healthcare appointments? Yes   Transportation Available family or friend will provide   On Dialysis? No   Does the patient receive services at the [facility]? No   Are there any  open cases? No   Discharge Plan A Home Health;Home with family   Discharge Plan B Home with family;Home Health   Patient/Family In Agreement With Plan yes

## 2017-04-12 NOTE — PT/OT/SLP PROGRESS
"Physical Therapy  Treatment    Mini Sexton   MRN: [id number]   Admitting Diagnosis: Degenerative joint disease (DJD) of hip    PT Received On: 17  PT Start Time: 1355     PT Stop Time: 1420    PT Total Time (min): 25 min       Billable Minutes:  Gait [id number] and Therapeutic Exercise 10    Treatment Type: Treatment  PT/PTA: PTA     PTA Visit Number: 2       General Precautions: Standard, fall  Orthopedic Precautions: LLE weight bearing as tolerated, LLE anterior precautions   Braces: N/A         Subjective:  Communicated with ns prior to session.  Pt c/o "lightheadedness" towards end of amb dist today, and slight inc in pain.    Pain Ratin/10  Location - Side: Left  Location - Orientation: generalized  Location: hip  Pain Addressed: Reposition, Distraction  Pain Rating Post-Intervention: 5/10    Objective:   Patient found with: peripheral IV, SCD    Functional Mobility:  Bed Mobility:   Sit to Supine: Minimum Assistance, With leg lift    Transfers:  Sit <> Stand Assistance: Contact Guard Assistance, Stand By Assistance  Sit <> Stand Assistive Device: Rolling Walker    Gait:   Gait Distance: 130' w/ RW and CGA/SBA, WBAT on LLE  Assistance 1: Contact Guard Assistance, Stand by Assistance  Gait Assistive Device: Rolling walker  Gait Pattern: 3-point gait  Gait Deviation(s): decreased vania, decreased step length, decreased weight-shifting ability    Stairs:  n/a    Balance:   Static Sit: GOOD: Takes MODERATE challenges from all directions  Dynamic Sit: GOOD: Maintains balance through MODERATE excursions of active trunk movement  Static Stand: FAIR: Maintains without assist but unable to take challenges  Dynamic stand: FAIR: Needs CONTACT GUARD during gait     Therapeutic Activities and Exercises:  Pt perf'd supine/seated LE ex's of AP's, QS,GS, heelslides, LAQ's x 10 ea.   BP seated after amb:80/51, nsg notified, Pt. ret'd to supine and reported feeling better.     AM-PAC 6 CLICK MOBILITY  How much help " "from another person does this patient currently need?   1 = Unable, Total/Dependent Assistance  2 = A lot, Maximum/Moderate Assistance  3 = A little, Minimum/Contact Guard/Supervision  4 = None, Modified [place]/Independent    Turning over in bed (including adjusting bedclothes, sheets and blankets)?: 4  Sitting down on and standing up from a chair with arms (e.g., wheelchair, bedside commode, etc.): 4  Moving from lying on back to sitting on the side of the bed?: 3  Moving to and from a bed to a chair (including a wheelchair)?: 3  Need to walk in hospital room?: 3  Climbing 3-5 steps with a railing?: 3  Total Score: 20    AM-PAC Raw Score CMS G-Code Modifier Level of Impairment Assistance   6 % Total / Unable   7 - 9 CM 80 - 100% Maximal Assist   10 - 14 CL 60 - 80% Moderate Assist   15 - 19 CK 40 - 60% Moderate Assist   20 - 22 CJ 20 - 40% Minimal Assist   23 CI 1-20% SBA / CGA   24 CH 0% Independent/ Mod I     Patient left HOB elevated with all lines intact, call button in reach and ns notified.    Assessment:  Mini Sexton is a 56 y.o. female with a medical diagnosis of Degenerative joint disease (DJD) of hip and presents with slight inc in amb dist this PM, though pt c/o "lightheadedness" and BP decreased..    Rehab identified problem list/impairments: Rehab identified problem list/impairments: weakness, impaired functional mobilty, gait instability, decreased lower extremity function, pain, orthopedic precautions    Rehab potential is excellent.    Activity tolerance: Good    Discharge recommendations: Discharge Facility/Level Of Care Needs: home health PT, home health OT     Barriers to discharge: Barriers to Discharge: None    Equipment recommendations: Equipment Needed After Discharge: none     GOALS:   Physical Therapy Goals        Problem: Physical Therapy Goal    Goal Priority Disciplines Outcome Goal Variances Interventions   Physical Therapy Goal     PT/OT, PT Ongoing (interventions " implemented as appropriate)     Description:  Goals to be met by: 2017     Patient will increase functional independence with mobility by performin. Supine to sit with Modified [place]  2. Sit to stand transfer with Modified [place]  3. Gait  x 200 feet with Modified [place] using Rolling Walker.                 PLAN:    Patient to be seen BID  to address the above listed problems via gait training, therapeutic activities, therapeutic exercises  Plan of Care expires: 17  Plan of Care reviewed with: patient         Yakelin Silva, MOIRA  2017

## 2017-04-12 NOTE — PLAN OF CARE
Problem: Occupational Therapy Goal  Goal: Occupational Therapy Goal  Goals to be met by: 5/11/17     Patient will increase functional independence with ADLs by performing:    LE Dressing with Minimal Assistance.  Grooming while standing with Supervision.  Toileting from toilet with Supervision for hygiene and clothing management.   Supine to sit with Supervision.  Stand pivot transfers with Supervision.  Toilet transfer to toilet with Supervision.   Outcome: Unable to achieve outcome(s) by discharge  Pt progressing well towards OT goals. Pt requiring increased assist for ADLs, however, will have assist at home if required. Per chart, pt to d/c this afternoon with HHOT/PT. Will d/c OT at this time

## 2017-04-12 NOTE — SUBJECTIVE & OBJECTIVE
"Principal Problem:Degenerative joint disease (DJD) of hip    Principal Orthopedic Problem: Post op MIKEY    Interval History: NAusea yest, tolerated PT, mild discomfort    Review of patient's allergies indicates:  No Known Allergies    Current Facility-Administered Medications   Medication    acetaminophen tablet 650 mg    atorvastatin tablet 40 mg    hydrochlorothiazide tablet 25 mg    HYDROmorphone injection 0.2 mg    loperamide capsule 4 mg    losartan tablet 100 mg    mupirocin 2 % ointment 1 g    ondansetron injection 4 mg    oxycodone immediate release tablet 10 mg    polyethylene glycol packet 17 g    potassium chloride SA CR tablet 20 mEq    promethazine (PHENERGAN) 6.25 mg in dextrose 5 % 50 mL IVPB    rivaroxaban tablet 10 mg    senna-docusate 8.6-50 mg per tablet 1 tablet    vitamin D 1000 units tablet 5,000 Units    vitamin renal formula (B-complex-vitamin c-folic acid) 1 mg per capsule 1 capsule    zolpidem tablet 5 mg     Objective:     Vital Signs (Most Recent):  Temp: 98.2 °F (36.8 °C) (04/12/17 0520)  Pulse: 91 (04/12/17 0520)  Resp: 18 (04/12/17 0520)  BP: 137/72 (04/12/17 0520)  SpO2: 96 % (04/12/17 0442) Vital Signs (24h Range):  Temp:  [98 °F (36.7 °C)-98.3 °F (36.8 °C)] 98.2 °F (36.8 °C)  Pulse:  [58-91] 91  Resp:  [13-19] 18  SpO2:  [93 %-99 %] 96 %  BP: ()/(53-82) 137/72     Weight: 76.7 kg (169 lb)  Height: 5' 2" (157.5 cm)  Body mass index is 30.91 kg/(m^2).      Intake/Output Summary (Last 24 hours) at 04/12/17 0742  Last data filed at 04/12/17 0606   Gross per 24 hour   Intake             2765 ml   Output             2272 ml   Net              493 ml       Ortho/SPM Exam Incision intact, mild tenderness, no drainage    Significant Labs:   CBC:   Recent Labs  Lab 04/12/17  0401   WBC 12.31   HGB 10.7*   HCT 32.1*        All pertinent labs within the past 24 hours have been reviewed.    Significant Imaging: X-Ray: I have reviewed all pertinent results/findings " and my personal findings are:  Post op hip films

## 2017-04-12 NOTE — PLAN OF CARE
Problem: Patient Care Overview  Goal: Plan of Care Review  Outcome: Ongoing (interventions implemented as appropriate)  Pt is AAOx3. Safety precautions maintained. On a clear liquid diet. Left hip dressing is intact. Pt has been on bedrest throughout the night and using the bedpan. Pt complained of pain and oxycodone and dilaudid given. Tolerated all medications well.

## 2017-04-12 NOTE — PROGRESS NOTES
"[facility]  Orthopedics  Progress Note    Patient Name: Mini Sexton  MRN: [id number]  Admission Date: 4/11/2017  Hospital Length of Stay: 1 days  Attending Provider: Len Vasquez MD  Primary Care Provider: Hector Ritter Iii, MD  [unfilled]  Subjective:     Principal Problem:Degenerative joint disease (DJD) of hip    Principal Orthopedic Problem: Post op MIKEY    Interval History: NAusea yest, tolerated PT, mild discomfort    Review of patient's allergies indicates:  No Known Allergies    Current Facility-Administered Medications   Medication    acetaminophen tablet 650 mg    atorvastatin tablet 40 mg    hydrochlorothiazide tablet 25 mg    HYDROmorphone injection 0.2 mg    loperamide capsule 4 mg    losartan tablet 100 mg    mupirocin 2 % ointment 1 g    ondansetron injection 4 mg    oxycodone immediate release tablet 10 mg    polyethylene glycol packet 17 g    potassium chloride SA CR tablet 20 mEq    promethazine (PHENERGAN) 6.25 mg in dextrose 5 % 50 mL IVPB    rivaroxaban tablet 10 mg    senna-docusate 8.6-50 mg per tablet 1 tablet    vitamin D 1000 units tablet 5,000 Units    vitamin renal formula (B-complex-vitamin c-folic acid) 1 mg per capsule 1 capsule    zolpidem tablet 5 mg     Objective:     Vital Signs (Most Recent):  Temp: 98.2 °F (36.8 °C) (04/12/17 0520)  Pulse: 91 (04/12/17 0520)  Resp: 18 (04/12/17 0520)  BP: 137/72 (04/12/17 0520)  SpO2: 96 % (04/12/17 0442) Vital Signs (24h Range):  Temp:  [98 °F (36.7 °C)-98.3 °F (36.8 °C)] 98.2 °F (36.8 °C)  Pulse:  [58-91] 91  Resp:  [13-19] 18  SpO2:  [93 %-99 %] 96 %  BP: ()/(53-82) 137/72     Weight: 76.7 kg (169 lb)  Height: 5' 2" (157.5 cm)  Body mass index is 30.91 kg/(m^2).      Intake/Output Summary (Last 24 hours) at 04/12/17 0742  Last data filed at 04/12/17 0606   Gross per 24 hour   Intake             2765 ml   Output             2272 ml   Net              493 ml       Ortho/SPM Exam Incision intact, " mild tenderness, no drainage    Significant Labs:   CBC:   Recent Labs  Lab 04/12/17  0401   WBC 12.31   HGB 10.7*   HCT 32.1*        All pertinent labs within the past 24 hours have been reviewed.    Significant Imaging: X-Ray: I have reviewed all pertinent results/findings and my personal findings are:  Post op hip films    Assessment/Plan:     No new Assessment & Plan notes have been filed under this hospital service since the last note was generated.  Service: Orthopedic Surgery        Len Vasquez MD  Orthopedics  [facility]

## 2017-04-13 ENCOUNTER — PATIENT OUTREACH (OUTPATIENT)
Dept: ADMINISTRATIVE | Facility: CLINIC | Age: 57
End: 2017-04-13
Payer: COMMERCIAL

## 2017-04-13 NOTE — PATIENT INSTRUCTIONS
Understanding Osteoarthritis of the Hip    A joint is a place where two bones meet. The hip is a ball-and-socket joint. It is formed where the ball at the top of the thighbone (femur) rests in the socket in the pelvic bone. The hip joint allows the leg to move freely in many directions.  Hip osteoarthritis is a condition where parts of the hip joint wear out. It can lead to pain, stiffness, and limited movement.   What is osteoarthritis?  All joints contain a smooth tissue called cartilage. Cartilage cushions the ends of bones, helping them glide against each other. Hip osteoarthritis occurs when cartilage in the hip joint begins to break down and wear away. The ball and socket bones may then become exposed and rub together. The cartilage may become rough and pitted and may begin to wear away. This prevents smooth movement of the joint and can lead to pain.  Causes of osteoarthritis of the hip  Causes can include:  · Wear and tear from normal use of the hip over time  · Overuse of the hip during sports or work activities  · Being overweight. This increases stress on the hip joint.  · Injury to the hip  · Infection of the hip joint  Symptoms of osteoarthritis of the hip  The main symptom of hip osteoarthritis is pain. The pain is most often felt in the groin area. It may also travel down the leg to the knee or to the back of the hip. The pain usually gets worse with activity, such as walking or climbing stairs. The pain may get better with rest. The joint may also be stiff first thing in the morning or after periods of sitting or lying down. Stiffness usually gets better with movement.  Treating osteoarthritis of the hip  Osteoarthritis is a long-term condition. Treatment usually focuses on managing symptoms. Treatment may include:  · Over-the-counter or prescription medicines taken by mouth to help relieve pain and swelling  · Injections of medicine into the joint to help relieve symptoms for a time  · A  weight-loss plan if you are overweight  · A plan of physical therapy and exercises to improve strength and flexibility around the joint  · Cold or heat packs help relieve pain and stiffness  · Assistive devices that help with movement, such as a cane or a walker  · Assistive devices that make activities of daily life easier, such as raised toilet seats or shower bars  If other treatments dont do enough to relieve severe symptoms, you may need surgery to replace the joint. This surgery replaces the hip joint with an artificial joint. It can help relieve pain and stiffness and improve function of the hip.     When to call your healthcare provider  Call your healthcare provider right away if you have any of these:  · Fever of 100.4°F (38°C) or higher, or as directed  · Symptoms that dont get better with prescribed medicines or get worse  · New symptoms   Date Last Reviewed: 3/10/2016  © 8032-3613 [employer]. [address] [place] [address]. All rights reserved. This information is not intended as a substitute for professional medical care. Always follow your healthcare professional's instructions.      Total Hip Replacement  Total hip replacement surgery almost always reduces joint pain. During this surgery, your problem hip joint is replaced with an artificial joint, called a prosthesis.  Benefits of hip replacement  Total hip replacement surgery almost always:  · Stops or greatly reduces hip pain. Even the pain from surgery should go away within weeks.  · Increases leg function. Without hip pain, youll be able to use your legs more. This will build up your muscles.  · Improves quality of life by allowing you to do daily tasks and low-impact activities in greater comfort.  · Provides years of easier movement. Most total hip replacements last for many years.  Your surgical experience  You will most likely arrive at the hospital on the morning of surgery. In many cases, pre-op tests are done  days or even weeks ahead of time. Follow all of your surgeons instructions on preparing for surgery. When you arrive, youll be given forms to fill out. You will also talk with the anesthesiologist,  the doctor who gives the anesthesia, if you havent done so already.  Preparing for surgery  You will be told when to stop eating and drinking before surgery. If you take daily medicines, especially blood thinners, ask your doctor if you should still take them the morning of surgery. You may need to stop certain medicines several days or weeks before the surgery. At the hospital your temperature, pulse, breathing, and blood pressure will be checked. An IV (intravenous) line will be started to provide fluids and medicines needed during surgery.    The surgical procedure  When the surgical team is ready, youll be taken to the operating room. There youll be given anesthesia. The anesthesia will help you sleep through surgery, or it will make you numb from the waist down. Then an incision is made, giving the surgeon access to your hip joint. The damaged ball is removed, and the socket is prepared to hold the prosthesis. After the new joint is in place, the incision is closed with staples or stitches.  Preparing the bone  The hip is a ball-and-socket joint. The ball is cut from the thighbone, and the surface of the old socket is smoothed. Then the new socket is put into the pelvis. The socket is usually press-fit and may be held in place with screws. A press-fit prosthesis has tiny pores on its surface that your bone will grow into. Cement or press-fit may be used to hold the ball-and-stem portion of the hip replacement.    Joining the new parts  The new hip stem is inserted into the upper portion of your thighbone. After the stem is secure in the thighbone, the new ball and socket are joined. The stem of the prosthesis may be held with cement or press-fit. Your surgeon will choose the method that is best for you.  In the  recovery room  After surgery youll be sent to the recovery room, also called the PACU (postanesthesia care unit). Your condition will be watched closely, and youll be given pain medications. You may have a catheter (small tube) in your bladder and a drain in your hip. To keep your new joint stable, a foam wedge or pillows may be placed between your legs. In some cases, a brace is used.  Risks and complications  As with any surgery, hip replacement has possible risks and complications. These include the following:  · Reaction to the anesthesia  · Blood clots  · Infection  · Dislocation of the joint or loosening of the prosthesis  · Fracture  · Wearing out the prosthetic  · Damage to nearby blood vessels, bones, or nerves  · Thigh pain   Date Last Reviewed: 8/28/2015  © 1004-2909 [employer]. [address] [place] [address], [place] [address]. All rights reserved. This information is not intended as a substitute for professional medical care. Always follow your healthcare professional's instructions.

## 2017-04-24 NOTE — DISCHARGE SUMMARY
[facility] General Surgery  Discharge Summary      Patient Name: Mini Sexton  MRN: [id number]  Admission Date: 4/11/2017  Hospital Length of Stay: 1 days  Discharge Date and Time: 4/12/2017  3:51 PM  Attending Physician: No att. providers found   Discharging Provider: Len Vasquez MD  Primary Care Provider: Hector Ritter Iii, MD     HPI: Admitted for MIKEY for DJD left hip    Procedure(s) (LRB):  ARTHROPLASTY-HIP (Left)     Hospital Course: After surgery, admitted for therapy, pain control and to monitor NV status.  At discharge, tolerating diet and pain on oral meds    Consults: PT, OT, RT, SW    Significant Diagnostic Studies: Labs: CBC No results for input(s): WBC, HGB, HCT, PLT in the last 48 hours.  Radiology: X-Ray: Knee films    Pending Diagnostic Studies:     None        Final Active Diagnoses:    Diagnosis Date Noted POA    PRINCIPAL PROBLEM:  Degenerative joint disease (DJD) of hip [M16.9] 11/29/2016 Yes    Anemia [D64.9] 04/12/2017 No    Status post hip replacement [Z96.649] 04/12/2017 Not Applicable    Acute posthemorrhagic anemia [D62] 11/30/2016 Yes      Problems Resolved During this Admission:    Diagnosis Date Noted Date Resolved POA      Discharged Condition: stable    Disposition: Home or Self Care    Follow Up:  Follow-up Information     Follow up with Len Vasquez MD On 4/24/2017.    Specialty:  Orthopedic Surgery    Why:  @10am, For suture removal, For wound re-check    Contact information:    [address]  [address]  [facility] [id number] [phone]          Follow up with [facility].    Specialties:  Home Health Services, Physical Therapy, Occupational Therapy    Why:  Home Health    Contact information:    [address]  [address]  [facility] [id number] [phone]          Patient Instructions:     Referral to Home health   Referral Priority: Routine Referral Type: Home Health   Referral Reason: Specialty Services Required     Referred to Provider: [facility] Requested Specialty: Home Health Services   Number of Visits Requested: 1      Diet general     Shower on day dressing removed (No bath)   Order Comments: On 4/13/2017     Weight bearing as tolerated     No driving, operating heavy equipment or signing legal documents while taking pain medication     Call MD for:  temperature >100.4     Call MD for:  persistent nausea and vomiting     Call MD for:  severe uncontrolled pain     Call MD for:  difficulty breathing, headache or visual disturbances     Call MD for:  redness, tenderness, or signs of infection (pain, swelling, redness, odor or green/yellow discharge around incision site)     Call MD for:  hives     Call MD for:  persistent dizziness or light-headedness     Call MD for:  extreme fatigue     No dressing needed       Medications:  Reconciled Home Medications:   Discharge Medication List as of 4/12/2017  2:17 PM      START taking these medications    Details   ondansetron (ZOFRAN-ODT) 4 MG TbDL Take 1 tablet (4 mg total) by mouth every 8 (eight) hours as needed., Starting 4/12/2017, Until Discontinued, No Print      oxycodone-acetaminophen (PERCOCET)  mg per tablet Take 1 tablet by mouth every 6 (six) hours as needed for Pain., Starting 4/12/2017, Until Discontinued, No Print      rivaroxaban (XARELTO) 10 mg Tab Take 1 tablet (10 mg total) by mouth once daily., Starting 4/12/2017, Until Thu 4/12/18, No Print      senna-docusate 8.6-50 mg (PERICOLACE) 8.6-50 mg per tablet Take 1 tablet by mouth 2 (two) times daily., Starting 4/12/2017, Until Discontinued, OTC         CONTINUE these medications which have NOT CHANGED    Details   aspirin-acetaminophen-caffeine 250-250-65 mg (EXCEDRIN MIGRAINE) 250-250-65 mg per tablet Take 1 tablet by mouth every 6 (six) hours as needed for Pain., Until Discontinued, Historical Med      atorvastatin (LIPITOR) 40 MG tablet Take 40 mg by mouth once daily., Until  Discontinued, Historical Med      B-complex with vitamin C (Z-BEC OR EQUIV) tablet Take 1 tablet by mouth once daily., Until Discontinued, Historical Med      biotin 10,000 mcg TbDL Take by mouth., Until Discontinued, Historical Med      CALCIUM CARBONATE/VITAMIN D3 (VITAMIN D-3 ORAL) Take 2,000 mg by mouth once daily., Until Discontinued, Historical Med      losartan-hydrochlorothiazide 100-25 mg (HYZAAR) 100-25 mg per tablet Take 1 tablet by mouth once daily., Until Discontinued, Historical Med      potassium chloride SA (K-DUR,KLOR-CON) 10 MEQ tablet Take 20 mEq by mouth 2 (two) times daily., Until Discontinued, Historical Med             Len Vasquez MD  General Surgery  [facility]

## 2020-10-21 ENCOUNTER — HOSPITAL ENCOUNTER (OUTPATIENT)
Dept: CARDIOLOGY | Facility: HOSPITAL | Age: 60
Discharge: HOME | End: 2020-10-21
Attending: FAMILY MEDICINE
Payer: COMMERCIAL

## 2020-10-21 VITALS — HEIGHT: 62 IN | BODY MASS INDEX: 31.1 KG/M2 | WEIGHT: 169 LBS

## 2020-10-21 DIAGNOSIS — R78.81 BACTEREMIA: ICD-10-CM

## 2020-10-21 DIAGNOSIS — R50.9 FEVER: ICD-10-CM

## 2020-10-21 DIAGNOSIS — R06.02 SOB (SHORTNESS OF BREATH): ICD-10-CM

## 2020-10-21 LAB
AORTIC ROOT ANNULUS: 3.36 CM
ASCENDING AORTA: 3.08 CM
AV INDEX (PROSTH): 0.65
AV MEAN GRADIENT: 4 MMHG
AV PEAK GRADIENT: 6 MMHG
AV VALVE AREA: 2.08 CM2
AV VELOCITY RATIO: 0.76
BSA FOR ECHO PROCEDURE: 1.83 M2
CV ECHO LV RWT: 0.46 CM
DOP CALC AO PEAK VEL: 1.19 M/S
DOP CALC AO VTI: 29.36 CM
DOP CALC LVOT AREA: 3.2 CM2
DOP CALC LVOT DIAMETER: 2.01 CM
DOP CALC LVOT PEAK VEL: 0.91 M/S
DOP CALC LVOT STROKE VOLUME: 60.99 CM3
DOP CALCLVOT PEAK VEL VTI: 19.23 CM
E WAVE DECELERATION TIME: 207.67 MSEC
E/A RATIO: 1.08
E/E' RATIO: 8.25 M/S
ECHO LV POSTERIOR WALL: 1.05 CM (ref 0.6–1.1)
FRACTIONAL SHORTENING: 43 % (ref 28–44)
INTERVENTRICULAR SEPTUM: 1.14 CM (ref 0.6–1.1)
LA MAJOR: 4.63 CM
LA WIDTH: 3.12 CM
LEFT ATRIUM SIZE: 3.49 CM
LEFT INTERNAL DIMENSION IN SYSTOLE: 2.63 CM (ref 2.1–4)
LEFT VENTRICLE DIASTOLIC VOLUME INDEX: 54.31 ML/M2
LEFT VENTRICLE DIASTOLIC VOLUME: 96.65 ML
LEFT VENTRICLE MASS INDEX: 101 G/M2
LEFT VENTRICLE SYSTOLIC VOLUME INDEX: 14.2 ML/M2
LEFT VENTRICLE SYSTOLIC VOLUME: 25.32 ML
LEFT VENTRICULAR INTERNAL DIMENSION IN DIASTOLE: 4.59 CM (ref 3.5–6)
LEFT VENTRICULAR MASS: 179.45 G
LV LATERAL E/E' RATIO: 8.25 M/S
LV SEPTAL E/E' RATIO: 8.25 M/S
MV PEAK A VEL: 0.61 M/S
MV PEAK E VEL: 0.66 M/S
MV STENOSIS PRESSURE HALF TIME: 60.22 MS
MV VALVE AREA P 1/2 METHOD: 3.65 CM2
PISA TR MAX VEL: 1.77 M/S
RA MAJOR: 3.89 CM
RA WIDTH: 3.55 CM
STJ: 2.55 CM
TDI LATERAL: 0.08 M/S
TDI SEPTAL: 0.08 M/S
TDI: 0.08 M/S
TR MAX PG: 13 MMHG
TRICUSPID ANNULAR PLANE SYSTOLIC EXCURSION: 2.14 CM

## 2020-10-21 PROCEDURE — 93306 TTE W/DOPPLER COMPLETE: CPT | Mod: 26,,, | Performed by: INTERNAL MEDICINE

## 2020-10-21 PROCEDURE — 93306 ECHO (CUPID ONLY): ICD-10-PCS | Mod: 26,,, | Performed by: INTERNAL MEDICINE

## 2020-10-21 PROCEDURE — 93306 TTE W/DOPPLER COMPLETE: CPT

## (undated) DEVICE — BLADE SAW SAG 22.13MM 0.92MM

## (undated) DEVICE — SEE MEDLINE ITEM 152530

## (undated) DEVICE — BIT DRILL FLEX 30MM.

## (undated) DEVICE — SUPPORT ULNA NERVE PROTECTOR

## (undated) DEVICE — GLOVE SURGICAL LATEX SZ 8

## (undated) DEVICE — TAPE SURG MEDIPORE 6X72IN

## (undated) DEVICE — SEE MEDLINE ITEM 157117

## (undated) DEVICE — SEE MEDLINE ITEM 152622

## (undated) DEVICE — COVER OVERHEAD SURG LT BLUE

## (undated) DEVICE — Device

## (undated) DEVICE — SPONGE LAP 18X18 PREWASHED

## (undated) DEVICE — SEE MEDLINE ITEM 146292

## (undated) DEVICE — BLADE SURG CARBON STEEL #10

## (undated) DEVICE — DRAPE PLASTIC U 60X72

## (undated) DEVICE — SYRINGE 0.9% NACL 10MIL PREFIL

## (undated) DEVICE — MANIFOLD 4 PORT

## (undated) DEVICE — PACK OPTIMA GEN ORTHO

## (undated) DEVICE — SEE MEDLINE ITEM 156955

## (undated) DEVICE — DRAPE STERI U-SHAPED 47X51IN

## (undated) DEVICE — SEE MEDLINE ITEM 153151

## (undated) DEVICE — PAD ABDOMINAL 5X9 STERILE

## (undated) DEVICE — SEALER BIPOLAR TISSUE 6.0

## (undated) DEVICE — SEE MEDLINE ITEM 157131

## (undated) DEVICE — ORTHOCORDVIOLET OS-6 36

## (undated) DEVICE — GAUZE SPONGE 4X4 12PLY

## (undated) DEVICE — UNDERGLOVES BIOGEL PI SIZE 7.5

## (undated) DEVICE — SYS CLSR DERMABOND PRINEO 22CM

## (undated) DEVICE — DRAPE STERI INSTRUMENT 1018

## (undated) DEVICE — TAPE SILK 3IN

## (undated) DEVICE — ALCOHOL 70% ISOP W/GREEN 16OZ

## (undated) DEVICE — HOOD T-5 TEAR AWAY STERILE

## (undated) DEVICE — SEE MEDLINE ITEM 157116

## (undated) DEVICE — SOL IRR NACL .9% 3000ML

## (undated) DEVICE — SUT STRATAFIX PGAPCL 3 FS-1

## (undated) DEVICE — SUT 2/0 36IN COATED VICRYL

## (undated) DEVICE — COVER BACK TABLE 72X21

## (undated) DEVICE — ELECTRODE REM PLYHSV RETURN 9

## (undated) DEVICE — ELECTRODE BLADE TEFLON 6

## (undated) DEVICE — TAPE ADH MEDIPORE 4 X 10YDS

## (undated) DEVICE — APPLICATOR CHLORAPREP ORN 26ML

## (undated) DEVICE — DRAPE INCISE IOBAN 2 23X33IN

## (undated) DEVICE — DRAPE HIP TIBURON 87X115X134